# Patient Record
Sex: MALE | Race: WHITE | NOT HISPANIC OR LATINO | URBAN - METROPOLITAN AREA
[De-identification: names, ages, dates, MRNs, and addresses within clinical notes are randomized per-mention and may not be internally consistent; named-entity substitution may affect disease eponyms.]

---

## 2021-03-03 DIAGNOSIS — Z23 NEED FOR VACCINATION: ICD-10-CM

## 2022-10-24 ENCOUNTER — TELEMEDICINE (OUTPATIENT)
Dept: BEHAVIORAL HEALTH | Facility: CLINIC | Age: 67
End: 2022-10-24
Payer: COMMERCIAL

## 2022-10-24 DIAGNOSIS — F90.0 ATTENTION DEFICIT HYPERACTIVITY DISORDER (ADHD), PREDOMINANTLY INATTENTIVE TYPE: ICD-10-CM

## 2022-10-24 DIAGNOSIS — F90.0 ADHD (ATTENTION DEFICIT HYPERACTIVITY DISORDER), INATTENTIVE TYPE: ICD-10-CM

## 2022-10-24 DIAGNOSIS — F33.2 MDD (MAJOR DEPRESSIVE DISORDER), RECURRENT SEVERE, WITHOUT PSYCHOSIS (HCC): ICD-10-CM

## 2022-10-24 PROCEDURE — 99204 OFFICE O/P NEW MOD 45 MIN: CPT | Mod: 95 | Performed by: PSYCHIATRY & NEUROLOGY

## 2022-10-24 PROCEDURE — 90833 PSYTX W PT W E/M 30 MIN: CPT | Mod: 95 | Performed by: PSYCHIATRY & NEUROLOGY

## 2022-10-24 RX ORDER — VORTIOXETINE 10 MG/1
10 TABLET, FILM COATED ORAL
Qty: 90 TABLET | Refills: 0 | Status: SHIPPED | OUTPATIENT
Start: 2022-10-24 | End: 2023-03-06

## 2022-10-24 RX ORDER — DEXTROAMPHETAMINE SACCHARATE, AMPHETAMINE ASPARTATE, DEXTROAMPHETAMINE SULFATE AND AMPHETAMINE SULFATE 2.5; 2.5; 2.5; 2.5 MG/1; MG/1; MG/1; MG/1
10 TABLET ORAL 3 TIMES DAILY
Qty: 90 TABLET | Refills: 0 | Status: SHIPPED | OUTPATIENT
Start: 2022-11-03 | End: 2022-11-01 | Stop reason: SDUPTHER

## 2022-10-24 RX ORDER — DEXTROAMPHETAMINE SACCHARATE, AMPHETAMINE ASPARTATE, DEXTROAMPHETAMINE SULFATE AND AMPHETAMINE SULFATE 2.5; 2.5; 2.5; 2.5 MG/1; MG/1; MG/1; MG/1
10 TABLET ORAL EVERY MORNING
Qty: 90 TABLET | Refills: 0 | Status: SHIPPED | OUTPATIENT
Start: 2022-11-04 | End: 2022-10-24 | Stop reason: SDUPTHER

## 2022-10-24 ASSESSMENT — PATIENT HEALTH QUESTIONNAIRE - PHQ9
SUM OF ALL RESPONSES TO PHQ QUESTIONS 1-9: 20
CLINICAL INTERPRETATION OF PHQ2 SCORE: 6
5. POOR APPETITE OR OVEREATING: 3 - NEARLY EVERY DAY

## 2022-10-24 NOTE — PROGRESS NOTES
"[facility] AT [facility]  INITIAL PSYCHIATRY EVALUATION    This evaluation was conducted via Zoom, using secure and encrypted videoconferencing technology. The patient was physically located at their home address in [place], [place], and the physician was located at her home office in [place], [place]. The patient was presented by self. The patient’s identity was confirmed and verbal consent for the telemedicine encounter was obtained.      CC:  Initial Evaluation and Medication Management of Mental Health Symptoms      History Of Present Illness:  Hudson Fitch is a 67 y.o. old male with history of ADHD, Inattentive Type and MDD, [facility], presents today to establish care and for evaluation.     The patient reported the following:  He was diagnosed approx 2001 with ADHD after working as a  and evaluating children for ADHD.  He realized he struggled with the symptoms.  He endorses all the inattentive symptoms but denies hyperactive or impulsive symptoms.  He takes Adderall 10 mg TID.  [facility]    MDD:  He takes Wellbutrin 150 mg BID.  He endorses anhedonia, feeling depressed and down but denies feelings of hopelessness.  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  He watches a lot of TV, news, and doesn't have the motivation to do much more.  He does play pickle ball once a week.  He feels tired all the time and has low energy.  He sleeps pretty well, no problems falling asleep, despite the stimulant, wellbutrin and drinking 4 cups of coffee/day.  F/u on snoring next visit.  He has gained 20 lbs approx. over the last year.  He moves more slowly and talks less than he did in the past.    ROS: As noted above in HPI.        Past Psychiatric History:  Denies any " hospitalizations  Denies any history of SI, SA or self harm  Medication trials:  Lexapro, Zoloft, Prozac, Effexor - didn't think any of these worked.  Has been on Wellbutrin for approximately 3 years.      Family Psychiatric History:  Denies    Substance Use/Addiction History:  Alcohol:  Denies  Cannabis:  about twice a month smokes to fall asleep  Tobacco:  Denies  Caffeine:  4 cups of coffee/day  Other:  Denies any other substances    Social History:  He was born in [place], moved to [place] approx age 3, enjoyed school, denies any hx of abuse or trauma, masters degree in social work, worked for the [employer] [place] until approx. 2011 when there were budget cuts and he was moved to part-time, moved to [place] to be close to his brother who took a job in [place].  Used to enjoy skiing and hunting.  Never .  Served in the [employer] for 3 years and then the [employer].    Allergies:  Pcn [penicillins]      Physical Examination and Mental Status Exam:  Vital signs: There were no vitals taken for this visit.    CONSTITUTIONAL:  General Appearance:  Casual attire, good eye contact, engaged with provider    ORIENTATION:  Oriented to time, place and person  RECENT AND REMOTE MEMORY:  Grossly intact  ATTENTION SPAN AND CONCENTRATION:  within normal range  LANGUAGE:  no deficits appreciated  FUND OF KNOWLEDGE:  has awareness of current events, past history and normal vocabulary  SPEECH:  normal volume, amount, rate and articulation, no perseveration or paucity of language  MOOD:  Depressed   AFFECT:  Congruent with mood  THOUGHT PROCESS:  logical and goal directed  THOUGHT CONTENT:  Denies any SI/HI or AVH, no delusional thinking nor preoccupations appreciated  ASSOCIATIONS:  Intact, not loose, no tangentiality or circumstantiality  MEMORY:  No gross evidence of memory deficits  JUDGMENT:  adequate concerning everyday activities  INSIGHT:  adequate to psychiatric condition    DIAGNOSTIC IMPRESSION:  1. ADHD (attention deficit hyperactivity  disorder), inattentive type  - amphetamine-dextroamphetamine (ADDERALL, 10MG,) 10 MG Tab; Take 1 Tablet by mouth every morning for 10 days.  Dispense: 90 Tablet; Refill: 0    2. MDD (major depressive disorder), recurrent severe, without psychosis (HCC)  - Vortioxetine HBr (TRINTELLIX) 10 MG Tab; Take 10 mg by mouth every day.  Dispense: 90 Tablet; Refill: 0       Assessment and Plan:  The patient's risk of suicide is assessed as low.  1.  MDD, recurrent, severe, worsening  ADHD  R/o MCI  R/o SAMMY  Fatigue/low energy  F/u on snoring  Begin Trintellix 10 mg, MDD  Continue Adderall 10 mg TID, ADHD  Continue Wellbutrin 150 mg BID  Educated him about Auvelity, and he can add Dextromethorphan OTC 45 mg BID along with the Wellbutrtin, for MDD  He is not interested in TMS or Ketamine  Educated him about the IOP, he will think about it  Educated him about sleep music for falling asleep  Will be mindful he smokes MJ a couple of times a month to help him fall asleep  Encouraged him to make an appt with a [facility] for a baseline cognitive assessment  Encouraged him to make an appt with a therapist through the [facility]  Completed a PHQ9, score 20  Reviewed [place] PDMP  Reviewed his medical record    2.  Developed a safety plan with the patient which included the 1800 crisis line phone and text lines or going to the nearest ED if symptoms worsen    3.  Risks, benefits, alternatives and side effects were discussed for all medicines prescribed at this visit.  The patient voiced understanding providing informed consent.  The patient agrees to call the clinic with any questions or concerns, or seek emergent medical care if warranted.    4.  Follow up in 4 weeks or call sooner PRN    The proposed treatment plan was discussed with the patient who was provided the opportunity to ask questions and make suggestions regarding alternative treatment. Patient verbalized understanding and expressed agreement with the plan.     Greater than 16  minutes of the visit was spent in psychotherapy.  Psychotherapy include:  Provided the patient with supportive psychotherapy to build rapport and establish a therapeutic alliance with the patient, psychoeducation, topics: reviewed of symptoms of depression, history of ADHD, cannabis use, sleep music, socialization.     Holly Wylie M.D.      This note was created using voice recognition software (Dragon). The accuracy of the dictation is limited by the abilities of the software. I have reviewed the note prior to signing, however some errors in grammar and context are still possible. If you have any questions related to this note please do not hesitate to contact our office.

## 2022-11-01 DIAGNOSIS — F90.0 ADHD (ATTENTION DEFICIT HYPERACTIVITY DISORDER), INATTENTIVE TYPE: ICD-10-CM

## 2022-11-01 NOTE — TELEPHONE ENCOUNTER
Caller Name: Hudson Fitch  Call Back Number: [phone]    How would the patient prefer to be contacted with a response: Phone call do NOT leave a detailed message    Patient states he [facility], [facility] looks like it was sent to the [facility] pharmacy but his insurance will not pay for it unless it's at the [facility] pharmacy. Patient states he runs out on 11/4/22. Please advise.

## 2022-11-02 RX ORDER — DEXTROAMPHETAMINE SACCHARATE, AMPHETAMINE ASPARTATE, DEXTROAMPHETAMINE SULFATE AND AMPHETAMINE SULFATE 2.5; 2.5; 2.5; 2.5 MG/1; MG/1; MG/1; MG/1
10 TABLET ORAL 3 TIMES DAILY
Qty: 90 TABLET | Refills: 0 | Status: SHIPPED | OUTPATIENT
Start: 2022-11-03 | End: 2022-11-21

## 2022-11-21 ENCOUNTER — TELEMEDICINE (OUTPATIENT)
Dept: BEHAVIORAL HEALTH | Facility: CLINIC | Age: 67
End: 2022-11-21
Payer: COMMERCIAL

## 2022-11-21 DIAGNOSIS — F90.0 ATTENTION DEFICIT HYPERACTIVITY DISORDER (ADHD), PREDOMINANTLY INATTENTIVE TYPE: ICD-10-CM

## 2022-11-21 DIAGNOSIS — F33.2 MDD (MAJOR DEPRESSIVE DISORDER), RECURRENT SEVERE, WITHOUT PSYCHOSIS (HCC): ICD-10-CM

## 2022-11-21 DIAGNOSIS — G31.84 MCI (MILD COGNITIVE IMPAIRMENT): ICD-10-CM

## 2022-11-21 DIAGNOSIS — F90.0 ADHD (ATTENTION DEFICIT HYPERACTIVITY DISORDER), INATTENTIVE TYPE: ICD-10-CM

## 2022-11-21 PROCEDURE — 90833 PSYTX W PT W E/M 30 MIN: CPT | Mod: 95 | Performed by: PSYCHIATRY & NEUROLOGY

## 2022-11-21 PROCEDURE — 99214 OFFICE O/P EST MOD 30 MIN: CPT | Mod: 95 | Performed by: PSYCHIATRY & NEUROLOGY

## 2022-11-21 RX ORDER — DEXTROAMPHETAMINE SACCHARATE, AMPHETAMINE ASPARTATE, DEXTROAMPHETAMINE SULFATE AND AMPHETAMINE SULFATE 3.75; 3.75; 3.75; 3.75 MG/1; MG/1; MG/1; MG/1
15 TABLET ORAL 2 TIMES DAILY
Qty: 90 TABLET | Refills: 0 | Status: SHIPPED | OUTPATIENT
Start: 2022-11-21 | End: 2022-11-22

## 2022-11-21 NOTE — PROGRESS NOTES
"[facility] AT [facility]  PSYCHIATRIC FOLLOW-UP NOTE    This evaluation was conducted via Zoom, using secure and encrypted videoconferencing technology. The patient was physically located at their home address in [place], [place], and the physician was located at her home office in [place], [place]. The patient was presented by self. The patient’s identity was confirmed and verbal consent for the telemedicine encounter was obtained.    CC:  Presents for follow up visit for medication evaluation and management        History Of Present Illness:  Hudson Fitch is a 67 y.o. old male with history of ADHD, Inattentive Type and MDD, and SAMMY - not complaint with CPAP, [facility], presents today for a follow up visit.    The patient reported the following:  The [facility] would not approve the Trintellix.   He feels about the same as last visit, no motivation, no energy.  He does have a CPAP but says it doesn't help how he feels, educated him about benefits, says the readings show it is working correctly. 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  He took a higher dose of Adderall in the past and it did help.    History from 10/24/22 visit: \"He was diagnosed approx 2001 with ADHD after working as a  and evaluating children for ADHD.  He realized he struggled with the symptoms.  He endorses all the inattentive symptoms but denies hyperactive or impulsive symptoms.  He takes Adderall 10 mg TID.  [facility].    MDD:  He takes Wellbutrin 150 mg BID.  He endorses anhedonia, feeling depressed and down but denies feelings of hopelessness.  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  He watches a lot of TV, news, and doesn't have the motivation to do much more.  He does play pickle ball once a week.  He feels tired all the time and has low energy.  He sleeps pretty well, no problems falling asleep, despite the stimulant, wellbutrin and drinking 4 cups of coffee/day.  F/u on snoring next visit.  He has gained 20 lbs approx. over the last year.  He moves more slowly and talks less than he did in the past.\"    ROS: As noted above in HPI.        Past Psychiatric History:  Denies any hospitalizations  Denies any history of SI, SA or self harm  Medication trials:  Lexapro, Zoloft, Prozac, Effexor - didn't think any of these worked.  Has been on Wellbutrin for approximately 3 years.      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  Used to enjoy skiing and hunting.  Never .  Served in the [employer] for 3 years and then the [employer].    Allergies:  Pcn [penicillins]      Physical Examination and Mental Status Exam:  Vital signs: There were no vitals taken for this visit.    CONSTITUTIONAL:  General Appearance:  Casual attire, good eye contact, engaged with provider    ORIENTATION:  Oriented to time, place and person  RECENT AND REMOTE MEMORY:  Grossly intact  ATTENTION SPAN AND CONCENTRATION:  within normal range  LANGUAGE:  no deficits appreciated  FUND OF KNOWLEDGE:  has awareness of current events, " past history and normal vocabulary  SPEECH:  normal volume, amount, rate and articulation, no perseveration or paucity of language  MOOD:  Depressed   AFFECT:  Constricted  THOUGHT PROCESS:  logical and goal directed  THOUGHT CONTENT:  Denies any SI/HI or AVH, no delusional thinking nor preoccupations appreciated  ASSOCIATIONS:  Intact, not loose, no tangentiality or circumstantiality  MEMORY:  No gross evidence of memory deficits  JUDGMENT:  adequate concerning everyday activities  INSIGHT:  adequate to psychiatric condition    DIAGNOSTIC IMPRESSION:  1. ADHD (attention deficit hyperactivity disorder), inattentive type  - amphetamine-dextroamphetamine (ADDERALL, 15MG,) 15 MG tablet; Take 1 Tablet by mouth 2 times a day for 30 days.  Dispense: 90 Tablet; Refill: 0       Assessment and Plan:  The patient's risk of suicide is assessed as low.  Will be mindful he has had 2 ischemic strokes that affected him cognitively, reduced IQ by 30 points first stroke and he is on Coumadin.  1.  MDD, recurrent, severe, no change  ADHD  R/o MCI  SAMMY, uncontrolled  MCI, no change  Fatigue/low energy, no change  Educated him about MIND diet for protective properties against Dementia  Hold, [facility] wouldn't approve: Begin Trintellix 10 mg, MDD  Increase Adderall to 15 mg TID, from 10 mg TID, ADHD and off lable for MDD  Continue Wellbutrin 150 mg BID  Previously educated him about Auvelity, and he can add Dextromethorphan OTC 45 mg BID along with the Wellbutrtin, for MDD  He is not interested in TMS or Ketamine  Previously educated him about the IOP, he will think about it  Previously educated him about sleep music for falling asleep  Will be mindful he smokes MJ a couple of times a month to help him fall asleep  Encouraged him to make an appt with a [facility] Neurologist for a baseline cognitive assessment  Encouraged him to make an appt with a therapist through the [facility]  Initial visit  PHQ9, score 20  Reviewed [place] PDMP  Reviewed prior visit HPI,  "histories and treatment plan in preparation for today's visit      2.  The patient has a safety plan that includes calling the 1-800 crisis line number, calling 911 and/or going to the nearest Emergency Department if symptoms worsen.    3.  Risks, benefits, alternatives and side effects were discussed for all medicines prescribed at this visit.  The patient voiced understanding providing informed consent.  The patient agrees to call the clinic with any questions or concerns, or seek emergent medical care if warranted.    4.  Follow up in 4 weeks or call sooner PRN    The proposed treatment plan was discussed with the patient who was provided the opportunity to ask questions and make suggestions regarding alternative treatment. Patient verbalized understanding and expressed agreement with the plan.     Greater than 16 minutes of the visit was spent in psychotherapy.     Psychotherapy include:  Supportive psychotherapy and psychoeducation, topics: MIND diet, diets he has tried in the past, history of cognitive decline, college at [employer] and went from As to Fs. Importance of using CPAP - cognitive center of brain robbed of oxygen with uncontrolled SAMMY.  Cognitive struggles - processing and word finding. \"I'm a fraction of who I used to be.\"        Holly Wylie M.D.      This note was created using voice recognition software (Dragon). The accuracy of the dictation is limited by the abilities of the software. I have reviewed the note prior to signing, however some errors in grammar and context are still possible. If you have any questions related to this note please do not hesitate to contact our office.   "

## 2022-11-22 DIAGNOSIS — F90.0 ADHD (ATTENTION DEFICIT HYPERACTIVITY DISORDER), INATTENTIVE TYPE: ICD-10-CM

## 2022-11-22 RX ORDER — DEXTROAMPHETAMINE SACCHARATE, AMPHETAMINE ASPARTATE, DEXTROAMPHETAMINE SULFATE AND AMPHETAMINE SULFATE 2.5; 2.5; 2.5; 2.5 MG/1; MG/1; MG/1; MG/1
15 TABLET ORAL 3 TIMES DAILY PRN
Qty: 135 TABLET | Refills: 0 | Status: SHIPPED | OUTPATIENT
Start: 2022-11-22 | End: 2022-12-09 | Stop reason: SDUPTHER

## 2022-12-09 ENCOUNTER — TELEMEDICINE (OUTPATIENT)
Dept: BEHAVIORAL HEALTH | Facility: CLINIC | Age: 67
End: 2022-12-09
Payer: COMMERCIAL

## 2022-12-09 DIAGNOSIS — F90.0 ADHD (ATTENTION DEFICIT HYPERACTIVITY DISORDER), INATTENTIVE TYPE: ICD-10-CM

## 2022-12-09 DIAGNOSIS — F33.2 MDD (MAJOR DEPRESSIVE DISORDER), RECURRENT SEVERE, WITHOUT PSYCHOSIS (HCC): ICD-10-CM

## 2022-12-09 DIAGNOSIS — F90.0 ATTENTION DEFICIT HYPERACTIVITY DISORDER (ADHD), PREDOMINANTLY INATTENTIVE TYPE: ICD-10-CM

## 2022-12-09 PROCEDURE — 99214 OFFICE O/P EST MOD 30 MIN: CPT | Mod: 95 | Performed by: PSYCHIATRY & NEUROLOGY

## 2022-12-09 RX ORDER — DEXTROAMPHETAMINE SACCHARATE, AMPHETAMINE ASPARTATE, DEXTROAMPHETAMINE SULFATE AND AMPHETAMINE SULFATE 2.5; 2.5; 2.5; 2.5 MG/1; MG/1; MG/1; MG/1
15 TABLET ORAL 3 TIMES DAILY PRN
Qty: 135 TABLET | Refills: 0 | Status: SHIPPED | OUTPATIENT
Start: 2023-01-21 | End: 2023-02-03 | Stop reason: SDUPTHER

## 2022-12-09 RX ORDER — BUPROPION HYDROCHLORIDE 150 MG/1
150 TABLET, EXTENDED RELEASE ORAL 2 TIMES DAILY
Qty: 180 TABLET | Refills: 0 | Status: SHIPPED | OUTPATIENT
Start: 2022-12-09 | End: 2023-03-06 | Stop reason: SDUPTHER

## 2022-12-09 RX ORDER — DEXTROAMPHETAMINE SACCHARATE, AMPHETAMINE ASPARTATE, DEXTROAMPHETAMINE SULFATE AND AMPHETAMINE SULFATE 2.5; 2.5; 2.5; 2.5 MG/1; MG/1; MG/1; MG/1
10 TABLET ORAL 3 TIMES DAILY PRN
Qty: 135 TABLET | Refills: 0 | Status: SHIPPED | OUTPATIENT
Start: 2023-02-20 | End: 2023-03-06 | Stop reason: SDUPTHER

## 2022-12-09 RX ORDER — DEXTROAMPHETAMINE SACCHARATE, AMPHETAMINE ASPARTATE, DEXTROAMPHETAMINE SULFATE AND AMPHETAMINE SULFATE 2.5; 2.5; 2.5; 2.5 MG/1; MG/1; MG/1; MG/1
15 TABLET ORAL 3 TIMES DAILY PRN
Qty: 135 TABLET | Refills: 0 | Status: SHIPPED | OUTPATIENT
Start: 2022-12-22 | End: 2023-01-21

## 2022-12-09 NOTE — PROGRESS NOTES
"[facility]  PSYCHIATRIC FOLLOW-UP NOTE    This evaluation was conducted via Zoom, using secure and encrypted videoconferencing technology. The patient was physically located at their home address in [place], [place], and the physician was located at her home office in [place], [place]. The patient was presented by self. The patient’s identity was confirmed and verbal consent for the telemedicine encounter was obtained.    CC:  Presents for follow up visit for medication evaluation and management        History Of Present Illness:  Hudson Fitch is a 67 y.o. old male with history of ADHD, Inattentive Type and MDD, and SAMMY - not complaint with CPAP, hx of ischemic stroke in 2010 and says neuropsych testing showed his IQ dropped 30 pts and then he had a second stroke in 2012, [facility], presents today for a follow up visit.    The patient reported the following:  He is very pleased with how much better he feels and is doing with the increase of Adderall from 10 mg TID to 15 mg TID.  Has more energy, feels better able to tackle things he needs to get done, says he hasn't felt this good in many years, and he doesn't want to start an antidepressant at this time, we were not able to get the  [facility] to approve the Trintellix at his visit before last.  He is sleeping fine, denies any SE.  Has a BP cuff but it is old, educated him about importance to check it given Adderall can increase BP and Pulse and given his hx of stroke.      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  He took a higher dose of Adderall in the past and it did help.    History from 10/24/22 visit: \"He was diagnosed approx 2001 with ADHD after working as a  and evaluating children for ADHD.  He " "realized he struggled with the symptoms.  He endorses all the inattentive symptoms but denies hyperactive or impulsive symptoms.  He takes Adderall 10 mg TID.  [facility].    MDD:  He takes Wellbutrin 150 mg BID.  He endorses anhedonia, feeling depressed and down but denies feelings of hopelessness.  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  He watches a lot of TV, news, and doesn't have the motivation to do much more.  He does play pickle ball once a week.  He feels tired all the time and has low energy.  He sleeps pretty well, no problems falling asleep, despite the stimulant, wellbutrin and drinking 4 cups of coffee/day.  F/u on snoring next visit.  He has gained 20 lbs approx. over the last year.  He moves more slowly and talks less than he did in the past.\"    ROS: As noted above in HPI.        Past Psychiatric History:  Denies any hospitalizations  Denies any history of SI, SA or self harm  Medication trials:  Lexapro, Zoloft, Prozac, Effexor - didn't think any of these worked.  Has been on Wellbutrin for approximately 3 years.      Family Psychiatric History:  Denies    Substance Use/Addiction History:  Alcohol:  Denies  Cannabis:  about twice a month smokes to fall asleep  Tobacco:  Denies  Caffeine:  4 cups of coffee/day  Other:  Denies any other substances    Social History:  He was born in [place], moved to [place] appro[place] age 3, enjoyed school, denies any hx of abuse or trauma, masters degree in social work, worked for the [employer] until approx. 2011 when there were budget cuts and he was moved to part-time, moved to [place] to be close to his brother who took a job in [place].  Used to enjoy skiing and hunting.  Never .  Served in the [employer] for 3 years and then the [employer].    Allergies:  Pcn " [penicillins]      Physical Examination and Mental Status Exam:  Vital signs: There were no vitals taken for this visit.    CONSTITUTIONAL:  General Appearance:  Casual attire, good eye contact, engaged with provider    ORIENTATION:  Oriented to time, place and person  RECENT AND REMOTE MEMORY:  Grossly intact  ATTENTION SPAN AND CONCENTRATION:  within normal range  LANGUAGE:  no deficits appreciated  FUND OF KNOWLEDGE:  has awareness of current events, past history and normal vocabulary  SPEECH:  normal volume, amount, rate and articulation, no perseveration or paucity of language  MOOD:  Neutral  AFFECT:  Mood congreunt  THOUGHT PROCESS:  logical and goal directed  THOUGHT CONTENT:  Denies any SI/HI or AVH, no delusional thinking nor preoccupations appreciated  ASSOCIATIONS:  Intact, not loose, no tangentiality or circumstantiality  MEMORY:  No gross evidence of memory deficits  JUDGMENT:  adequate concerning everyday activities  INSIGHT:  adequate to psychiatric condition    DIAGNOSTIC IMPRESSION:  1. ADHD (attention deficit hyperactivity disorder), inattentive type  - amphetamine-dextroamphetamine (ADDERALL, 10MG,) 10 MG Tab; Take 1.5 Tablets by mouth 3 times a day as needed (ADHD) for up to 30 days.  Dispense: 135 Tablet; Refill: 0  - amphetamine-dextroamphetamine (ADDERALL, 10MG,) 10 MG Tab; Take 1.5 Tablets by mouth 3 times a day as needed (ADHD) for up to 30 days.  Dispense: 135 Tablet; Refill: 0  - amphetamine-dextroamphetamine (ADDERALL, 10MG,) 10 MG Tab; Take 1 Tablet by mouth 3 times a day as needed (ADHD) for up to 30 days.  Dispense: 135 Tablet; Refill: 0    2. MDD (major depressive disorder), recurrent severe, without psychosis ([facility])  - buPROPion SR (WELLBUTRIN-SR) 150 MG TABLET SR 12 HR sustained-release tablet; Take 1 Tablet by mouth 2 times a day.  Dispense: 180 Tablet; Refill: 0    3. Attention deficit hyperactivity disorder (ADHD), predominantly inattentive type  - buPROPion SR (WELLBUTRIN-SR)  150 MG TABLET SR 12 HR sustained-release tablet; Take 1 Tablet by mouth 2 times a day.  Dispense: 180 Tablet; Refill: 0       Assessment and Plan:  The patient's risk of suicide is assessed as low.  Will be mindful he has had 2 ischemic strokes that affected him cognitively, reduced IQ by 30 points first stroke and he is on Coumadin.  1.  MDD, recurrent, severe, improving  ADHD, improving  R/o MCI  SAMMY, uncontrolled  MCI, no change  Fatigue/low energy, improving  Obtain BP cuff to monitor BP while on Adderall  Previously educated him about MIND diet for protective properties against Dementia  Hold, VA wouldn't approve: Begin Trintellix 10 mg, MDD  Continue Adderall 15 mg TID, from 10 mg TID, ADHD and off lable for MDD, increased 11/21/22  Continue Wellbutrin 150 mg BID  Previously educated him about Auvelity, and he can add Dextromethorphan OTC 45 mg BID along with the Wellbutrtin, for MDD  He is not interested in TMS or Ketamine  Previously educated him about the IOP, he will think about it  Previously educated him about sleep music for falling asleep  Will be mindful he smokes MJ a couple of times a month to help him fall asleep  Encouraged him to make an appt with a [facility] Neurologist for a baseline cognitive assessment  Encouraged him to make an appt with a therapist through the [facility]  Initial visit  PHQ9, score 20  Reviewed [place] PDMP  Reviewed prior visit HPI, histories and treatment plan in preparation for today's visit      2.  The patient has a safety plan that includes calling the 1-800 crisis line number, calling 911 and/or going to the nearest Emergency Department if symptoms worsen.    3.  Risks, benefits, alternatives and side effects were discussed for all medicines prescribed at this visit.  The patient voiced understanding providing informed consent.  The patient agrees to call the clinic with any questions or concerns, or seek emergent medical care if warranted.    4.  Follow up in 12 weeks or call sooner  PRN    The proposed treatment plan was discussed with the patient who was provided the opportunity to ask questions and make suggestions regarding alternative treatment. Patient verbalized understanding and expressed agreement with the plan.       Holly Wylie M.D.      This note was created using voice recognition software (Dragon). The accuracy of the dictation is limited by the abilities of the software. I have reviewed the note prior to signing, however some errors in grammar and context are still possible. If you have any questions related to this note please do not hesitate to contact our office.

## 2023-02-03 DIAGNOSIS — F90.0 ADHD (ATTENTION DEFICIT HYPERACTIVITY DISORDER), INATTENTIVE TYPE: ICD-10-CM

## 2023-02-03 RX ORDER — DEXTROAMPHETAMINE SACCHARATE, AMPHETAMINE ASPARTATE, DEXTROAMPHETAMINE SULFATE AND AMPHETAMINE SULFATE 2.5; 2.5; 2.5; 2.5 MG/1; MG/1; MG/1; MG/1
15 TABLET ORAL 3 TIMES DAILY
Qty: 135 TABLET | Refills: 0 | Status: SHIPPED | OUTPATIENT
Start: 2023-02-03 | End: 2023-03-05

## 2023-02-03 NOTE — TELEPHONE ENCOUNTER
's patient.   Can you please re-send the Adderall prescription, the pharmacy does not keep prescriptions on hold and patient has been out since 1/29. Thank you!        Received request via: Pharmacy    Was the patient seen in the last year in this department? Yes    Does the patient have an active prescription (recently filled or refills available) for medication(s) requested? No    Does the patient have intermediate Plus and need 100 day supply (blood pressure, diabetes and cholesterol meds only)? Medication is not for cholesterol, blood pressure or diabetes and Patient does not have SCP

## 2023-03-06 ENCOUNTER — TELEMEDICINE (OUTPATIENT)
Dept: BEHAVIORAL HEALTH | Facility: CLINIC | Age: 68
End: 2023-03-06
Payer: COMMERCIAL

## 2023-03-06 DIAGNOSIS — F33.2 MDD (MAJOR DEPRESSIVE DISORDER), RECURRENT SEVERE, WITHOUT PSYCHOSIS (HCC): ICD-10-CM

## 2023-03-06 DIAGNOSIS — F90.0 ATTENTION DEFICIT HYPERACTIVITY DISORDER (ADHD), PREDOMINANTLY INATTENTIVE TYPE: ICD-10-CM

## 2023-03-06 DIAGNOSIS — F90.0 ADHD (ATTENTION DEFICIT HYPERACTIVITY DISORDER), INATTENTIVE TYPE: ICD-10-CM

## 2023-03-06 PROCEDURE — 90833 PSYTX W PT W E/M 30 MIN: CPT | Mod: 95 | Performed by: PSYCHIATRY & NEUROLOGY

## 2023-03-06 PROCEDURE — 99214 OFFICE O/P EST MOD 30 MIN: CPT | Mod: 95 | Performed by: PSYCHIATRY & NEUROLOGY

## 2023-03-06 RX ORDER — DEXTROAMPHETAMINE SACCHARATE, AMPHETAMINE ASPARTATE, DEXTROAMPHETAMINE SULFATE AND AMPHETAMINE SULFATE 2.5; 2.5; 2.5; 2.5 MG/1; MG/1; MG/1; MG/1
15 TABLET ORAL 3 TIMES DAILY PRN
Qty: 135 TABLET | Refills: 0 | Status: SHIPPED | OUTPATIENT
Start: 2023-05-07 | End: 2023-05-08 | Stop reason: SDUPTHER

## 2023-03-06 RX ORDER — DEXTROAMPHETAMINE SACCHARATE, AMPHETAMINE ASPARTATE, DEXTROAMPHETAMINE SULFATE AND AMPHETAMINE SULFATE 2.5; 2.5; 2.5; 2.5 MG/1; MG/1; MG/1; MG/1
15 TABLET ORAL 3 TIMES DAILY PRN
Qty: 135 TABLET | Refills: 0 | Status: SHIPPED | OUTPATIENT
Start: 2023-04-06 | End: 2023-04-07 | Stop reason: SDUPTHER

## 2023-03-06 RX ORDER — BUPROPION HYDROCHLORIDE 150 MG/1
150 TABLET, EXTENDED RELEASE ORAL 2 TIMES DAILY
Qty: 180 TABLET | Refills: 1 | Status: SHIPPED | OUTPATIENT
Start: 2023-03-06 | End: 2023-09-11 | Stop reason: SDUPTHER

## 2023-03-06 RX ORDER — DEXTROAMPHETAMINE SACCHARATE, AMPHETAMINE ASPARTATE, DEXTROAMPHETAMINE SULFATE AND AMPHETAMINE SULFATE 2.5; 2.5; 2.5; 2.5 MG/1; MG/1; MG/1; MG/1
15 TABLET ORAL 3 TIMES DAILY PRN
Qty: 135 TABLET | Refills: 0 | Status: SHIPPED | OUTPATIENT
Start: 2023-03-06 | End: 2023-04-05

## 2023-03-06 ASSESSMENT — PATIENT HEALTH QUESTIONNAIRE - PHQ9
CLINICAL INTERPRETATION OF PHQ2 SCORE: 0
5. POOR APPETITE OR OVEREATING: 0 - NOT AT ALL

## 2023-03-06 NOTE — PROGRESS NOTES
[facility] AT [facility]  PSYCHIATRIC FOLLOW-UP NOTE    This evaluation was conducted via Zoom, using secure and encrypted videoconferencing technology. The patient was physically located at their home address in [place], [place], and the physician was located at her home office in [place], [place]. The patient was presented by self. The patient’s identity was confirmed and verbal consent for the telemedicine encounter was obtained.    CC:  Presents for follow up visit for medication evaluation and management        History Of Present Illness:  Hudson Fitch is a 67 y.o. old male with history of ADHD, Inattentive Type and MDD, and SAMMY - not complaint with CPAP, hx of ischemic stroke in 2010 and says neuropsych testing showed his IQ dropped 30 pts and then he had a second [facility] in 2012[facility], [facility] today for a follow up visit.    The patient reported the following:  He continues to be doing really well, having more energy, feeling more positive, enjoying life more, being able to focus and concentrate to get things done.  He is working 4 days per week 4 hours each of those days, on a psych montoya, I believe, in meal prep, has friends/relationships at work and it's important to him, had neuropsych testing in 2008/09 following his strokes and that's where he learned his IQ went from 131 to 100.  No medication SE, no chest pain or palpitations.  He forgot to check his BP but agreed to do so.  Has hx of a cardiomyopathy but started on Coreg and states that it resolved, has continued on Coreg. Has been on Adderall for many years.  Feeling so good he has started lifting weights at home.      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  He took a higher dose of Adderall in the past and it did  "help.    History from 10/24/22 visit: \"He was diagnosed approx 2001 with ADHD after working as a  and evaluating children for ADHD.  He realized he struggled with the symptoms.  He endorses all the inattentive symptoms but denies hyperactive or impulsive symptoms.  He takes Adderall 10 mg TID.  [facility]    MDD:  He takes Wellbutrin 150 mg BID.  He endorses anhedonia, feeling depressed and down but denies feelings of hopelessness.  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  He watches a lot of TV, news, and doesn't have the motivation to do much more.  He does play pickle ball once a week.  He feels tired all the time and has low energy.  He sleeps pretty well, no problems falling asleep, despite the stimulant, wellbutrin and drinking 4 cups of coffee/day.  F/u on snoring next visit.  He has gained 20 lbs approx. over the last year.  He moves more slowly and talks less than he did in the past.\"    ROS: As noted above in HPI.        Past Psychiatric History:  Denies any hospitalizations  Denies any history of SI, SA or self harm  Medication trials:  Lexapro, Zoloft, Prozac, Effexor - didn't think any of these worked.  Has been on Wellbutrin for approximately 3 years.      Family Psychiatric History:  Denies    Substance Use/Addiction History:  Alcohol:  Denies  Cannabis:  about twice a month smokes to fall asleep  Tobacco:  Denies  Caffeine:  4 cups of coffee/day, update 3/6/23 - 3 cups/day 1 AM 2 afternoon  Other:  Denies any other substances    Social History:  He was born in [place], moved to [place] approx age 3, enjoyed school, denies any hx of abuse or trauma, masters degree in social work, worked for the [employer] [place] until approx. 2011 when there were budget cuts and he was moved to part-time, moved to [place] " to be close to his brother who took a job in [place].  Used to enjoy skiing and hunting.  Never .  Served in the [employer] for 3 years and then the [employer].    Allergies:  Pcn [penicillins]      Physical Examination and Mental Status Exam:  Vital signs: There were no vitals taken for this visit.    CONSTITUTIONAL:  General Appearance:  Casual attire, good eye contact, engaged with provider    ORIENTATION:  Oriented to time, place and person  RECENT AND REMOTE MEMORY:  Grossly intact  ATTENTION SPAN AND CONCENTRATION:  within normal range  LANGUAGE:  no deficits appreciated  FUND OF KNOWLEDGE:  has awareness of current events, past history and normal vocabulary  SPEECH:  normal volume, amount, rate and articulation, no perseveration or paucity of language  MOOD:  Euthymic  AFFECT:  Full range  THOUGHT PROCESS:  logical and goal directed  THOUGHT CONTENT:  Denies any SI/HI or AVH, no delusional thinking nor preoccupations appreciated  ASSOCIATIONS:  Intact, not loose, no tangentiality or circumstantiality  MEMORY:  No gross evidence of memory deficits  JUDGMENT:  adequate concerning everyday activities  INSIGHT:  adequate to psychiatric condition    DIAGNOSTIC IMPRESSION:  1. ADHD (attention deficit hyperactivity disorder), inattentive type  - amphetamine-dextroamphetamine (ADDERALL, 10MG,) 10 MG Tab; Take 1.5 Tablets by mouth 3 times a day as needed (ADHD) for up to 30 days.  Dispense: 135 Tablet; Refill: 0  - amphetamine-dextroamphetamine (ADDERALL, 10MG,) 10 MG Tab; Take 1.5 Tablets by mouth 3 times a day as needed (Attention and Focus) for up to 30 days.  Dispense: 135 Tablet; Refill: 0  - amphetamine-dextroamphetamine (ADDERALL, 10MG,) 10 MG Tab; Take 1.5 Tablets by mouth 3 times a day as needed (Attention and Focus) for up to 30 days.  Dispense: 135 Tablet; Refill: 0    2. MDD (major depressive disorder), recurrent severe, without psychosis (HCC)  - amphetamine-dextroamphetamine (ADDERALL, 10MG,) 10 MG Tab;  Take 1.5 Tablets by mouth 3 times a day as needed (ADHD) for up to 30 days.  Dispense: 135 Tablet; Refill: 0  - amphetamine-dextroamphetamine (ADDERALL, 10MG,) 10 MG Tab; Take 1.5 Tablets by mouth 3 times a day as needed (Attention and Focus) for up to 30 days.  Dispense: 135 Tablet; Refill: 0  - amphetamine-dextroamphetamine (ADDERALL, 10MG,) 10 MG Tab; Take 1.5 Tablets by mouth 3 times a day as needed (Attention and Focus) for up to 30 days.  Dispense: 135 Tablet; Refill: 0  - buPROPion SR (WELLBUTRIN-SR) 150 MG TABLET SR 12 HR sustained-release tablet; Take 1 Tablet by mouth 2 times a day.  Dispense: 180 Tablet; Refill: 1    3. Attention deficit hyperactivity disorder (ADHD), predominantly inattentive type  - buPROPion SR (WELLBUTRIN-SR) 150 MG TABLET SR 12 HR sustained-release tablet; Take 1 Tablet by mouth 2 times a day.  Dispense: 180 Tablet; Refill: 1         Assessment and Plan:  The patient's risk of suicide is assessed as low.  Will be mindful he has had 2 ischemic strokes that affected him cognitively, reduced IQ by 30 points first stroke and he is on Coumadin.  1.  MDD, recurrent, severe, well controlled  ADHD, well controlled  R/o MCI  SAMMY, uncontrolled  MCI, no change  Got neuropsych testing following stroke in 2008/09, down from 131 to 100  Fatigue/low energy, resolved  Obtain BP cuff to monitor BP while on Adderall.  EDUCATED HIM ABOUT THE IMPORTANCE SO THAT THIS MD CAN CONTINUE HIS ADDERALL, HE AGREED TO DO SO  Previously educated him about MIND diet for protective properties against Dementia  Hold, [facility] wouldn't approve and now not needed: Begin Trintellix 10 mg, MDD  Continue Adderall 15 mg TID, from 10 mg TID, ADHD and off lable for MDD, increased 11/21/22  Continue Wellbutrin  mg BID  Previously educated him about Auvelity, and he can add Dextromethorphan OTC 45 mg BID along with the Wellbutrtin, for MDD  Previously educated him about sleep music for falling asleep  Will be mindful he smokes  MJ a couple of times a month to help him fall asleep  Encouraged him to make an appt with a therapist through the [facility]  Initial visit  PHQ9, score 20, score 3/6/23 0  Reviewed [place] PDMP  Reviewed prior visit HPI, histories and treatment plan in preparation for today's visit      2.  The patient has a safety plan that includes calling the 1-800 crisis line number, calling 911 and/or going to the nearest Emergency Department if symptoms worsen.    3.  Risks, benefits, alternatives and side effects were discussed for all medicines prescribed at this visit.  The patient voiced understanding providing informed consent.  The patient agrees to call the clinic with any questions or concerns, or seek emergent medical care if warranted.    4.  Follow up in 12 weeks or call sooner PRN    The proposed treatment plan was discussed with the patient who was provided the opportunity to ask questions and make suggestions regarding alternative treatment. Patient verbalized understanding and expressed agreement with the plan.     Greater than 16 minutes of the visit was spent in psychotherapy.     Psychotherapy include:  Supportive psychotherapy and psychoeducation, topics: socialization, benefits of working and interacting with others, self-care, working out, winter weather in [place], his love of living in [place].  Completed PHQ9.          Holly Wylie M.D.      This note was created using voice recognition software (Dragon). The accuracy of the dictation is limited by the abilities of the software. I have reviewed the note prior to signing, however some errors in grammar and context are still possible. If you have any questions related to this note please do not hesitate to contact our office.

## 2023-04-07 DIAGNOSIS — F90.0 ADHD (ATTENTION DEFICIT HYPERACTIVITY DISORDER), INATTENTIVE TYPE: ICD-10-CM

## 2023-04-07 DIAGNOSIS — F33.2 MDD (MAJOR DEPRESSIVE DISORDER), RECURRENT SEVERE, WITHOUT PSYCHOSIS (HCC): ICD-10-CM

## 2023-04-07 RX ORDER — DEXTROAMPHETAMINE SACCHARATE, AMPHETAMINE ASPARTATE, DEXTROAMPHETAMINE SULFATE AND AMPHETAMINE SULFATE 2.5; 2.5; 2.5; 2.5 MG/1; MG/1; MG/1; MG/1
15 TABLET ORAL 3 TIMES DAILY PRN
Qty: 135 TABLET | Refills: 0 | Status: SHIPPED | OUTPATIENT
Start: 2023-04-07 | End: 2023-04-30 | Stop reason: SDUPTHER

## 2023-04-07 NOTE — TELEPHONE ENCOUNTER
's patient.   Pharmacy needs a new script.       Received request via: Pharmacy    Was the patient seen in the last year in this department? Yes    Does the patient have an active prescription (recently filled or refills available) for medication(s) requested? No    Does the patient have nursing home Plus and need 100 day supply (blood pressure, diabetes and cholesterol meds only)? Medication is not for cholesterol, blood pressure or diabetes and Patient does not have SCP

## 2023-04-30 RX ORDER — DEXTROAMPHETAMINE SACCHARATE, AMPHETAMINE ASPARTATE, DEXTROAMPHETAMINE SULFATE AND AMPHETAMINE SULFATE 2.5; 2.5; 2.5; 2.5 MG/1; MG/1; MG/1; MG/1
15 TABLET ORAL 3 TIMES DAILY PRN
Qty: 135 TABLET | Refills: 0 | Status: SHIPPED | OUTPATIENT
Start: 2023-06-07 | End: 2023-07-07

## 2023-04-30 RX ORDER — DEXTROAMPHETAMINE SACCHARATE, AMPHETAMINE ASPARTATE, DEXTROAMPHETAMINE SULFATE AND AMPHETAMINE SULFATE 2.5; 2.5; 2.5; 2.5 MG/1; MG/1; MG/1; MG/1
15 TABLET ORAL 3 TIMES DAILY PRN
Qty: 135 TABLET | Refills: 0 | Status: SHIPPED | OUTPATIENT
Start: 2023-07-07 | End: 2023-07-11 | Stop reason: SDUPTHER

## 2023-05-08 DIAGNOSIS — F33.2 MDD (MAJOR DEPRESSIVE DISORDER), RECURRENT SEVERE, WITHOUT PSYCHOSIS (HCC): ICD-10-CM

## 2023-05-08 DIAGNOSIS — F90.0 ADHD (ATTENTION DEFICIT HYPERACTIVITY DISORDER), INATTENTIVE TYPE: ICD-10-CM

## 2023-05-08 NOTE — TELEPHONE ENCOUNTER
Pharmacy does not keep prescriptions on hold, needs a new script sent to them.       Received request via: Pharmacy    Was the patient seen in the last year in this department? Yes    Does the patient have an active prescription (recently filled or refills available) for medication(s) requested? No    Does the patient have prison Plus and need 100 day supply (blood pressure, diabetes and cholesterol meds only)? Medication is not for cholesterol, blood pressure or diabetes and Patient does not have SCP

## 2023-05-09 RX ORDER — DEXTROAMPHETAMINE SACCHARATE, AMPHETAMINE ASPARTATE, DEXTROAMPHETAMINE SULFATE AND AMPHETAMINE SULFATE 2.5; 2.5; 2.5; 2.5 MG/1; MG/1; MG/1; MG/1
15 TABLET ORAL 3 TIMES DAILY PRN
Qty: 135 TABLET | Refills: 0 | Status: SHIPPED | OUTPATIENT
Start: 2023-05-09 | End: 2023-06-05 | Stop reason: SDUPTHER

## 2023-06-05 ENCOUNTER — TELEMEDICINE (OUTPATIENT)
Dept: BEHAVIORAL HEALTH | Facility: CLINIC | Age: 68
End: 2023-06-05
Payer: COMMERCIAL

## 2023-06-05 DIAGNOSIS — F33.2 MDD (MAJOR DEPRESSIVE DISORDER), RECURRENT SEVERE, WITHOUT PSYCHOSIS (HCC): ICD-10-CM

## 2023-06-05 DIAGNOSIS — F90.0 ATTENTION DEFICIT HYPERACTIVITY DISORDER (ADHD), PREDOMINANTLY INATTENTIVE TYPE: ICD-10-CM

## 2023-06-05 DIAGNOSIS — F90.0 ADHD (ATTENTION DEFICIT HYPERACTIVITY DISORDER), INATTENTIVE TYPE: ICD-10-CM

## 2023-06-05 PROCEDURE — 90833 PSYTX W PT W E/M 30 MIN: CPT | Mod: 95 | Performed by: PSYCHIATRY & NEUROLOGY

## 2023-06-05 PROCEDURE — 99214 OFFICE O/P EST MOD 30 MIN: CPT | Mod: 95 | Performed by: PSYCHIATRY & NEUROLOGY

## 2023-06-05 RX ORDER — DEXTROAMPHETAMINE SACCHARATE, AMPHETAMINE ASPARTATE, DEXTROAMPHETAMINE SULFATE AND AMPHETAMINE SULFATE 2.5; 2.5; 2.5; 2.5 MG/1; MG/1; MG/1; MG/1
15 TABLET ORAL 3 TIMES DAILY PRN
Qty: 135 TABLET | Refills: 0 | Status: SHIPPED | OUTPATIENT
Start: 2023-06-09 | End: 2023-07-09

## 2023-06-05 NOTE — PROGRESS NOTES
"[facility] AT [facility]  PSYCHIATRIC FOLLOW-UP NOTE    This evaluation was conducted via Zoom, using secure and encrypted videoconferencing technology. The patient was physically located at their home address in [place], [place], and the physician was located at her home office in [place], [place]. The patient was presented by self. The patient’s identity was confirmed and verbal consent for the telemedicine encounter was obtained.    CC:  Presents for follow up visit for medication evaluation and management        History Of Present Illness:  Hudson Fitch is a 68 y.o. old male with history of ADHD, Inattentive Type and MDD, and SAMMY - not complaint with CPAP, hx of ischemic stroke in 2010 and says neuropsych testing showed his IQ dropped 30 pts and then he had a second stroke in 2012, [facility], presents today for a follow up visit.    The patient reported the following:  He continues to be doing well.  He fell on the snow and hit the back of his head a couple of months ago, knocked unconscious and no memory of it, wasn't able to work out for a couple of months, was working out at home, wants to lose 30 to 40 lbs.  Is now working 4 days per week, works at a [employer] [place] [employer] [address] [place] [address].  He is sleeping well.  He measured his BP and it was WNL, does have BP cuff at home, no SE to medications, does take the Adderall 15 mg TID.    had neuropsych testing in 2008/09 following his strokes and that's where he learned his IQ went from 131 to 100.      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  He took a higher dose of Adderall in the past and it did help.    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  He realized he struggled with the symptoms.  He endorses all the inattentive symptoms but denies hyperactive or impulsive symptoms.  He takes Adderall 10 mg TID.  [facility]    MDD:  He takes Wellbutrin 150 mg BID.  He endorses anhedonia, feeling depressed and down but denies feelings of hopelessness.  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  He watches a lot of TV, news, and doesn't have the motivation to do much more.  He does play pickle ball once a week.  He feels tired all the time and has low energy.  He sleeps pretty well, no problems falling asleep, despite the stimulant, wellbutrin and drinking 4 cups of coffee/day.  F/u on snoring next visit.  He has gained 20 lbs approx. over the last year.  He moves more slowly and talks less than he did in the past.\"    ROS: As noted above in HPI.        Past Psychiatric History:  Denies any hospitalizations  Denies any history of SI, SA or self harm  Medication trials:  Lexapro, Zoloft, Prozac, Effexor - didn't think any of these worked.  Has been on Wellbutrin for approximately 3 years.      Family Psychiatric History:  Denies    Substance Use/Addiction History:  Alcohol:  Denies  Cannabis:  about twice a month smokes to fall asleep  Tobacco:  Denies  Caffeine:  4 cups of coffee/day, update 3/6/23 - 3 cups/day 1 AM 2 afternoon  Other:  Denies any other substances    Social History:  He was born in [place], moved to [place] approx age 3, enjoyed school, denies any hx of abuse or trauma, masters degree in social work, worked for the [employer] [place] until approx. 2011 when there were budget cuts and he was moved to part-time, moved to [place] to be close to his brother who took a job in [place].  Used to enjoy skiing and hunting.  Never .  " Served in the [employer] for 3 years and then the [email or web address].    Allergies:  Pcn [penicillins]      Physical Examination and Mental Status Exam:  Vital signs: There were no vitals taken for this visit.    CONSTITUTIONAL:  General Appearance:  Casual attire, good eye contact, engaged with provider    ORIENTATION:  Oriented to time, place and person  RECENT AND REMOTE MEMORY:  Grossly intact  ATTENTION SPAN AND CONCENTRATION:  within normal range  LANGUAGE:  no deficits appreciated  FUND OF KNOWLEDGE:  has awareness of current events, past history and normal vocabulary  SPEECH:  normal volume, amount, rate and articulation, no perseveration or paucity of language  MOOD:  Euthymic  AFFECT:  Full range  THOUGHT PROCESS:  logical and goal directed  THOUGHT CONTENT:  Denies any SI/HI or AVH, no delusional thinking nor preoccupations appreciated  ASSOCIATIONS:  Intact, not loose, no tangentiality or circumstantiality  MEMORY:  No gross evidence of memory deficits  JUDGMENT:  adequate concerning everyday activities  INSIGHT:  adequate to psychiatric condition    DIAGNOSTIC IMPRESSION:  1. ADHD (attention deficit hyperactivity disorder), inattentive type  - amphetamine-dextroamphetamine (ADDERALL, 10MG,) 10 MG Tab; Take 1.5 Tablets by mouth 3 times a day as needed (Attention and Focus) for up to 30 days.  Dispense: 135 Tablet; Refill: 0    2. MDD (major depressive disorder), recurrent severe, without psychosis (HCC)  - amphetamine-dextroamphetamine (ADDERALL, 10MG,) 10 MG Tab; Take 1.5 Tablets by mouth 3 times a day as needed (Attention and Focus) for up to 30 days.  Dispense: 135 Tablet; Refill: 0         Assessment and Plan:  The patient's risk of suicide is assessed as low.  Will be mindful he has had 2 ischemic strokes that affected him cognitively, reduced IQ by 30 points first stroke and he is on Coumadin.  1.  MDD, recurrent, severe, in remission with medication  ADHD, stable  R/o MCI  SAMMY, uncontrolled  MCI, no  change  CONCUSSION with LOC APPROX MARCH/April 2023 - FELL ON SNOW/ICE AND HIT HEAD  Got neuropsych testing following stroke in 2008/09, down from 131 to 100  Fatigue/low energy, resolved  Improve diet - sent article on Flavanol  FOLLOW UP ON WHY HE ISN'T USING CPAP FOR HIS SAMMY  Has BP cuff to monitor BP while on Adderall.  He will provide reading next visit  Previously educated him about MIND diet for protective properties against Dementia  Hold, VA wouldn't approve and now not needed: Begin Trintellix 10 mg, MDD  Continue Adderall 15 mg TID, from 10 mg TID, ADHD and off lable for MDD, increased 11/21/22  Continue Wellbutrin  mg BID  Previously educated him about Auvelity, and he can add Dextromethorphan OTC 45 mg BID along with the Wellbutrtin, for MDD  Previously educated him about sleep music for falling asleep  Will be mindful he smokes MJ a couple of times a month to help him fall asleep  Encouraged him to make an appt with a therapist [facility] the [facility]  Initial visit  PHQ9, score 20, score 3/6/23 0  Reviewed prior visit HPI, histories and treatment plan in preparation for today's visit  Reviewed [place] PDMP      2.  The patient has a safety plan that includes calling the 1-800 crisis line number, calling 911 and/or going to the nearest Emergency Department if symptoms worsen.    3.  Risks, benefits, alternatives and side effects were discussed for all medicines prescribed at this visit.  The patient voiced understanding providing informed consent.  The patient agrees to call the clinic with any questions or concerns, or seek emergent medical care if warranted.    4.  Follow up in 12 weeks or call sooner PRN    The proposed treatment plan was discussed with the patient who was provided the opportunity to ask questions and make suggestions regarding alternative treatment. Patient verbalized understanding and expressed agreement with the plan.     Greater than 16 minutes of the visit was spent in psychotherapy.      Psychotherapy include:  Supportive psychotherapy and psychoeducation, topics: [place] law not being changed.  [place] law where he gets 66% less of his state pension b/c of SSN benefits.  Working 5 days per week now instead of 4 - benefits of this.        Holly Wylie M.D.      This note was created using voice recognition software (Dragon). The accuracy of the dictation is limited by the abilities of the software. I have reviewed the note prior to signing, however some errors in grammar and context are still possible. If you have any questions related to this note please do not hesitate to contact our office.

## 2023-07-11 DIAGNOSIS — F33.2 MDD (MAJOR DEPRESSIVE DISORDER), RECURRENT SEVERE, WITHOUT PSYCHOSIS (HCC): ICD-10-CM

## 2023-07-11 DIAGNOSIS — F90.0 ADHD (ATTENTION DEFICIT HYPERACTIVITY DISORDER), INATTENTIVE TYPE: ICD-10-CM

## 2023-07-11 NOTE — TELEPHONE ENCOUNTER
Please re-send to pharmacy.     Received request via: Patient    Was the patient seen in the last year in this department? Yes    Does the patient have an active prescription (recently filled or refills available) for medication(s) requested? No    Does the patient have shelter Plus and need 100 day supply (blood pressure, diabetes and cholesterol meds only)? Medication is not for cholesterol, blood pressure or diabetes and Patient does not have SCP

## 2023-07-12 RX ORDER — DEXTROAMPHETAMINE SACCHARATE, AMPHETAMINE ASPARTATE, DEXTROAMPHETAMINE SULFATE AND AMPHETAMINE SULFATE 2.5; 2.5; 2.5; 2.5 MG/1; MG/1; MG/1; MG/1
15 TABLET ORAL 3 TIMES DAILY PRN
Qty: 135 TABLET | Refills: 0 | Status: SHIPPED | OUTPATIENT
Start: 2023-07-12 | End: 2023-08-07 | Stop reason: SDUPTHER

## 2023-08-07 DIAGNOSIS — F33.2 MDD (MAJOR DEPRESSIVE DISORDER), RECURRENT SEVERE, WITHOUT PSYCHOSIS (HCC): ICD-10-CM

## 2023-08-07 DIAGNOSIS — F90.0 ADHD (ATTENTION DEFICIT HYPERACTIVITY DISORDER), INATTENTIVE TYPE: ICD-10-CM

## 2023-08-07 RX ORDER — DEXTROAMPHETAMINE SACCHARATE, AMPHETAMINE ASPARTATE, DEXTROAMPHETAMINE SULFATE AND AMPHETAMINE SULFATE 2.5; 2.5; 2.5; 2.5 MG/1; MG/1; MG/1; MG/1
15 TABLET ORAL 3 TIMES DAILY PRN
Qty: 135 TABLET | Refills: 0 | Status: SHIPPED | OUTPATIENT
Start: 2023-08-11 | End: 2023-09-10

## 2023-08-07 NOTE — TELEPHONE ENCOUNTER
Received request via: Pharmacy    Was the patient seen in the last year in this department? Yes    Does the patient have an active prescription (recently filled or refills available) for medication(s) requested? No    Does the patient have long-term Plus and need 100 day supply (blood pressure, diabetes and cholesterol meds only)? Medication is not for cholesterol, blood pressure or diabetes and Patient does not have SCP

## 2023-09-11 ENCOUNTER — TELEMEDICINE (OUTPATIENT)
Dept: BEHAVIORAL HEALTH | Facility: CLINIC | Age: 68
End: 2023-09-11
Payer: COMMERCIAL

## 2023-09-11 DIAGNOSIS — F33.2 MDD (MAJOR DEPRESSIVE DISORDER), RECURRENT SEVERE, WITHOUT PSYCHOSIS (HCC): ICD-10-CM

## 2023-09-11 DIAGNOSIS — F90.0 ADHD (ATTENTION DEFICIT HYPERACTIVITY DISORDER), INATTENTIVE TYPE: ICD-10-CM

## 2023-09-11 DIAGNOSIS — F90.0 ATTENTION DEFICIT HYPERACTIVITY DISORDER (ADHD), PREDOMINANTLY INATTENTIVE TYPE: ICD-10-CM

## 2023-09-11 PROCEDURE — 99214 OFFICE O/P EST MOD 30 MIN: CPT | Mod: 95 | Performed by: PSYCHIATRY & NEUROLOGY

## 2023-09-11 RX ORDER — DEXTROAMPHETAMINE SACCHARATE, AMPHETAMINE ASPARTATE, DEXTROAMPHETAMINE SULFATE AND AMPHETAMINE SULFATE 3.75; 3.75; 3.75; 3.75 MG/1; MG/1; MG/1; MG/1
15 TABLET ORAL 3 TIMES DAILY
Qty: 90 TABLET | Refills: 0 | Status: SHIPPED | OUTPATIENT
Start: 2023-11-11 | End: 2023-09-13 | Stop reason: SDUPTHER

## 2023-09-11 RX ORDER — DEXTROAMPHETAMINE SACCHARATE, AMPHETAMINE ASPARTATE, DEXTROAMPHETAMINE SULFATE AND AMPHETAMINE SULFATE 3.75; 3.75; 3.75; 3.75 MG/1; MG/1; MG/1; MG/1
15 TABLET ORAL 3 TIMES DAILY
Qty: 90 TABLET | Refills: 0 | Status: SHIPPED | OUTPATIENT
Start: 2023-09-11 | End: 2023-09-19 | Stop reason: SDUPTHER

## 2023-09-11 RX ORDER — DEXTROAMPHETAMINE SACCHARATE, AMPHETAMINE ASPARTATE, DEXTROAMPHETAMINE SULFATE AND AMPHETAMINE SULFATE 3.75; 3.75; 3.75; 3.75 MG/1; MG/1; MG/1; MG/1
15 TABLET ORAL 3 TIMES DAILY
Qty: 90 TABLET | Refills: 0 | Status: SHIPPED | OUTPATIENT
Start: 2023-10-12 | End: 2023-11-02

## 2023-09-11 RX ORDER — BUPROPION HYDROCHLORIDE 150 MG/1
150 TABLET, EXTENDED RELEASE ORAL 2 TIMES DAILY
Qty: 180 TABLET | Refills: 1 | Status: SHIPPED | OUTPATIENT
Start: 2023-09-11 | End: 2024-02-06 | Stop reason: SDUPTHER

## 2023-09-11 NOTE — PROGRESS NOTES
[facility] AT [facility]  PSYCHIATRIC FOLLOW-UP NOTE    This evaluation was conducted via Zoom, using secure and encrypted videoconferencing technology. The patient was physically located at their home address in [place], [place], and the physician was located at her home office in [place], [place]. The patient was presented by self. The patient’s identity was confirmed and verbal consent for the telemedicine encounter was obtained.    CC:  Presents for follow up visit for medication evaluation and management        History Of Present Illness:  Hudson Fitch is a 68 y.o. old male with history of ADHD, Inattentive Type and MDD, and SAMMY - not complaint with CPAP, hx of ischemic stroke in 2010 and says neuropsych testing showed his IQ dropped 30 pts and then he had a second [facility] in 2012[facility], presents today for a follow up visit.    The patient reported the following:  Approx March 2023: He fell on the snow and hit the back of his head a couple of months ago, knocked unconscious and no memory of it, wasn't able to work out for a couple of months, was working out at home.  He continues to do well.  He is now working 5 days a week 4 hours each of those days.  Attention and focus is good.  Says his SAMMY is mild and that he feels better not using his CPAP.  He states his blood pressure has been good.  Denies any medication side effects.  He is still working on his weight loss, is at approximately 219 pounds now and would like to get down to 190 pounds.  He has lost approximately 14 pounds.      had neuropsych testing in 2008/09 following his strokes and that's where he learned his IQ went from 131 to 100.      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  He took a higher dose of Adderall  "in the past and it did help.    History from 10/24/22 visit: \"He was diagnosed approx 2001 with ADHD after working as a  and evaluating children for ADHD.  He realized he struggled with the symptoms.  He endorses all the inattentive symptoms but denies hyperactive or impulsive symptoms.  He takes Adderall 10 mg TID.  [facility]    MDD:  He takes Wellbutrin 150 mg BID.  He endorses anhedonia, feeling depressed and down but denies feelings of hopelessness.  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  He watches a lot of TV, news, and doesn't have the motivation to do much more.  He does play pickle ball once a week.  He feels tired all the time and has low energy.  He sleeps pretty well, no problems falling asleep, despite the stimulant, wellbutrin and drinking 4 cups of coffee/day.  F/u on snoring next visit.  He has gained 20 lbs approx. over the last year.  He moves more slowly and talks less than he did in the past.\"    ROS: As noted above in HPI.        Past Psychiatric History:  Denies any hospitalizations  Denies any history of SI, SA or self harm  Medication trials:  Lexapro, Zoloft, Prozac, Effexor - didn't think any of these worked.  Has been on Wellbutrin for approximately 3 years.      Family Psychiatric History:  Denies    Substance Use/Addiction History:  Alcohol:  Denies  Cannabis:  about twice a month smokes to fall asleep  Tobacco:  Denies  Caffeine:  4 cups of coffee/day, update 3/6/23 - 3 cups/day 1 AM 2 afternoon  Other:  Denies any other substances    Social History:  He was born in [place], moved to [place] approx age 3, enjoyed school, denies any hx of abuse or trauma, masters degree in social work, worked for the [employer] until approx. 2011 when there were budget cuts and he was moved to " part-time, moved to [place] to be close to his brother who took a job in [place].  Used to enjoy skiing and hunting.  Never .  Served in the [employer] for 3 years and then the [employer].    Allergies:  Pcn [penicillins]      Physical Examination and Mental Status Exam:  Vital signs: There were no vitals taken for this visit.    CONSTITUTIONAL:  General Appearance:  Casual attire, good eye contact, engaged with provider    ORIENTATION:  Oriented to time, place and person  RECENT AND REMOTE MEMORY:  Grossly intact  ATTENTION SPAN AND CONCENTRATION:  within normal range  LANGUAGE:  no deficits appreciated  FUND OF KNOWLEDGE:  has awareness of current events, past history and normal vocabulary  SPEECH:  normal volume, amount, rate and articulation, no perseveration or paucity of language  MOOD:  Neutral  AFFECT:  Mood congruent  THOUGHT PROCESS:  logical and goal directed  THOUGHT CONTENT:  Denies any SI/HI or AVH, no delusional thinking nor preoccupations appreciated  ASSOCIATIONS:  Intact, not loose, no tangentiality or circumstantiality  MEMORY:  No gross evidence of memory deficits  JUDGMENT:  adequate concerning everyday activities  INSIGHT:  adequate to psychiatric condition    DIAGNOSTIC IMPRESSION:  1. ADHD (attention deficit hyperactivity disorder), inattentive type  - amphetamine-dextroamphetamine (ADDERALL, 15MG,) 15 MG tablet; Take 1 Tablet by mouth 3 times a day for 30 days.  Dispense: 90 Tablet; Refill: 0  - amphetamine-dextroamphetamine (ADDERALL, 15MG,) 15 MG tablet; Take 1 Tablet by mouth 3 times a day for 30 days.  Dispense: 90 Tablet; Refill: 0  - amphetamine-dextroamphetamine (ADDERALL, 15MG,) 15 MG tablet; Take 1 Tablet by mouth 3 times a day for 30 days.  Dispense: 90 Tablet; Refill: 0    2. MDD (major depressive disorder), recurrent severe, without psychosis (HCC)  - buPROPion SR (WELLBUTRIN-SR) 150 MG TABLET SR 12 HR sustained-release tablet; Take 1 Tablet by mouth 2 times a day.  Dispense: 180  Tablet; Refill: 1    3. Attention deficit hyperactivity disorder (ADHD), predominantly inattentive type  - buPROPion SR (WELLBUTRIN-SR) 150 MG TABLET SR 12 HR sustained-release tablet; Take 1 Tablet by mouth 2 times a day.  Dispense: 180 Tablet; Refill: 1         Assessment and Plan:  The patient's risk of suicide is assessed as low.  Will be mindful he has had 2 ischemic strokes that affected him cognitively, reduced IQ by 30 points first stroke and he is on Coumadin.  1.  MDD, recurrent, severe, stable  ADHD, stable  R/o MCI  SAMMY, mild, states he feels better without using his CPAP - used it in the past  MCI, no change  CONCUSSION with LOC APPROX MARCH/April 2023 - FELL ON SNOW/ICE AND HIT HEAD  Got neuropsych testing following stroke in 2008/09, down from 131 to 100  Fatigue/low energy, resolved  Improve diet - previously sent article on Flavanol  Has BP cuff to monitor BP while on Adderall.  He will provide reading next visit  Previously educated him about MIND diet for protective properties against Dementia  Hold, [facility] wouldn't approve and now not needed: Begin Trintellix 10 mg, MDD  Continue Adderall 15 mg TID, from 10 mg TID, ADHD and off lable for MDD, increased 11/21/22  Continue Wellbutrin  mg BID  Previously educated him about Auvelity, and he can add Dextromethorphan OTC 45 mg BID along with the Wellbutrtin, for MDD  Previously educated him about sleep music for falling asleep  Will be mindful he smokes MJ a couple of times a month to help him fall asleep  Encouraged him to make an appt with a therapist through the [facility]  Initial visit  PHQ9, score 20, score 3/6/23 0  Reviewed prior visit HPI, histories and treatment plan in preparation for today's visit  Reviewed [place] PDMP  Complete VA form for continuation of care when it is due - approx October 2.  The patient has a safety plan that includes calling the 3-682 crisis line number, calling 911 and/or going to the nearest Emergency Department if  symptoms worsen.    3.  Risks, benefits, alternatives and side effects were discussed for all medicines prescribed at this visit.  The patient voiced understanding providing informed consent.  The patient agrees to call the clinic with any questions or concerns, or seek emergent medical care if warranted.    4.  Follow up in 12 weeks or call sooner PRN    The proposed treatment plan was discussed with the patient who was provided the opportunity to ask questions and make suggestions regarding alternative treatment. Patient verbalized understanding and expressed agreement with the plan.       Holly Wylie M.D.      This note was created using voice recognition software (Dragon). The accuracy of the dictation is limited by the abilities of the software. I have reviewed the note prior to signing, however some errors in grammar and context are still possible. If you have any questions related to this note please do not hesitate to contact our office.

## 2023-09-13 DIAGNOSIS — F90.0 ADHD (ATTENTION DEFICIT HYPERACTIVITY DISORDER), INATTENTIVE TYPE: ICD-10-CM

## 2023-09-13 NOTE — TELEPHONE ENCOUNTER
[facility] from the [facility] stating that they do not stock Adderall IR 10 mg tabs. They are req a new script for Adderall 15 mg.    Received request via: Pharmacy    Was the patient seen in the last year in this department? Yes    Does the patient have an active prescription (recently filled or refills available) for medication(s) requested? No    Does the patient have detention Plus and need 100 day supply (blood pressure, diabetes and cholesterol meds only)? Medication is not for cholesterol, blood pressure or diabetes and Patient does not have SCP

## 2023-09-14 RX ORDER — DEXTROAMPHETAMINE SACCHARATE, AMPHETAMINE ASPARTATE, DEXTROAMPHETAMINE SULFATE AND AMPHETAMINE SULFATE 3.75; 3.75; 3.75; 3.75 MG/1; MG/1; MG/1; MG/1
15 TABLET ORAL 3 TIMES DAILY
Qty: 90 TABLET | Refills: 0 | Status: SHIPPED | OUTPATIENT
Start: 2023-11-11 | End: 2023-09-20

## 2023-09-19 DIAGNOSIS — F90.0 ADHD (ATTENTION DEFICIT HYPERACTIVITY DISORDER), INATTENTIVE TYPE: ICD-10-CM

## 2023-09-19 RX ORDER — DEXTROAMPHETAMINE SACCHARATE, AMPHETAMINE ASPARTATE, DEXTROAMPHETAMINE SULFATE AND AMPHETAMINE SULFATE 3.75; 3.75; 3.75; 3.75 MG/1; MG/1; MG/1; MG/1
15 TABLET ORAL 3 TIMES DAILY
Qty: 90 TABLET | Refills: 0 | Status: SHIPPED | OUTPATIENT
Start: 2023-09-19 | End: 2023-09-20

## 2023-09-20 DIAGNOSIS — F90.0 ADHD (ATTENTION DEFICIT HYPERACTIVITY DISORDER), INATTENTIVE TYPE: ICD-10-CM

## 2023-09-20 RX ORDER — DEXTROAMPHETAMINE SACCHARATE, AMPHETAMINE ASPARTATE, DEXTROAMPHETAMINE SULFATE AND AMPHETAMINE SULFATE 2.5; 2.5; 2.5; 2.5 MG/1; MG/1; MG/1; MG/1
15 TABLET ORAL 3 TIMES DAILY PRN
Qty: 135 TABLET | Refills: 0 | Status: SHIPPED | OUTPATIENT
Start: 2023-09-20 | End: 2023-10-31 | Stop reason: SDUPTHER

## 2023-10-31 DIAGNOSIS — F90.0 ADHD (ATTENTION DEFICIT HYPERACTIVITY DISORDER), INATTENTIVE TYPE: ICD-10-CM

## 2023-10-31 RX ORDER — DEXTROAMPHETAMINE SACCHARATE, AMPHETAMINE ASPARTATE, DEXTROAMPHETAMINE SULFATE AND AMPHETAMINE SULFATE 2.5; 2.5; 2.5; 2.5 MG/1; MG/1; MG/1; MG/1
15 TABLET ORAL 3 TIMES DAILY PRN
Qty: 135 TABLET | Refills: 0 | Status: SHIPPED | OUTPATIENT
Start: 2023-10-31 | End: 2023-12-04 | Stop reason: SDUPTHER

## 2023-10-31 NOTE — TELEPHONE ENCOUNTER
Received request via: Patient    Was the patient seen in the last year in this department? Yes    Does the patient have an active prescription (recently filled or refills available) for medication(s) requested? No    Does the patient have group home Plus and need 100 day supply (blood pressure, diabetes and cholesterol meds only)? Medication is not for cholesterol, blood pressure or diabetes and Patient does not have SCP

## 2023-11-02 ENCOUNTER — TELEMEDICINE (OUTPATIENT)
Dept: BEHAVIORAL HEALTH | Facility: CLINIC | Age: 68
End: 2023-11-02
Payer: COMMERCIAL

## 2023-11-02 DIAGNOSIS — F90.0 ADHD (ATTENTION DEFICIT HYPERACTIVITY DISORDER), INATTENTIVE TYPE: ICD-10-CM

## 2023-11-02 DIAGNOSIS — F33.2 MDD (MAJOR DEPRESSIVE DISORDER), RECURRENT SEVERE, WITHOUT PSYCHOSIS (HCC): ICD-10-CM

## 2023-11-02 PROCEDURE — 99214 OFFICE O/P EST MOD 30 MIN: CPT | Mod: 95 | Performed by: PSYCHIATRY & NEUROLOGY

## 2023-11-02 NOTE — PROGRESS NOTES
"[facility] AT [facility]  PSYCHIATRIC FOLLOW-UP NOTE    This evaluation was conducted via Zoom, using secure and encrypted videoconferencing technology. The patient was physically located at their home address in [place], [place], and the physician was located at her home office in [place], [place]. The patient was presented by self. The patient’s identity was confirmed and verbal consent for the telemedicine encounter was obtained.    CC:  Presents for follow up visit for medication evaluation and management        History Of Present Illness:  Hudson Fitch is a 68 y.o. old male with history of ADHD, Inattentive Type and MDD, and SAMMY - not complaint with CPAP, hx of ischemic stroke in 2010 and says neuropsych testing showed his IQ dropped 30 pts and then he had a second stroke in 2012, [facility], presents today for a follow up visit.    The patient reported the following:  Approx March 2023: He fell on the snow and hit the back of his head a couple of months ago, knocked unconscious and no memory of it, wasn't able to work out for a couple of months, was working out at home.  He has reduced the amount of time he has watched TV, used to turn it on 1st thing in AM.  Has continued to lose weight and is pleased with his progress, has more energy, has been lifting weights at home, has lost 24 lbs, is 22 lbs from his [employer] weight and this is his goal.  Is sleeping better.  Has gotten his COVID vaccine, had a bout of flu-like symptoms recently.  Work is going well, still working 5 days/week 4 hours each of those days.  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  Has continued the Wellbutrin  mg BID and the Adderall 15 mg TID.  Denies any medication SE, no chest pain or palpitations, HA, or dizziness.  He has dentures.    had neuropsych testing in 2008/09 following his strokes and that's where he learned his IQ went from 131 to 100.      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  He took a higher dose of Adderall in the past and it did help.    History from 10/24/22 visit: \"He was diagnosed approx 2001 with ADHD after working as a  and evaluating children for ADHD.  He realized he struggled with the symptoms.  He endorses all the inattentive symptoms but denies hyperactive or impulsive symptoms.  He takes Adderall 10 mg TID.  [facility]    MDD:  He takes Wellbutrin 150 mg BID.  He endorses anhedonia, feeling depressed and down but denies feelings of hopelessness.  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  He watches a lot of TV, news, and doesn't have the motivation to do much more.  He does play pickle ball once a week.  He feels tired all the time and has low energy.  He sleeps pretty well, no problems falling asleep, despite the stimulant, wellbutrin and drinking 4 cups of coffee/day.  F/u on snoring next visit.  He has gained 20 lbs approx. over the last year.  He moves more slowly and talks less than he did in the past.\"    ROS: As noted above in HPI.        Past Psychiatric History:  Denies any hospitalizations  Denies any history of SI, SA or self harm  Medication trials:  Lexapro, Zoloft, Prozac, Effexor - didn't think any of these worked.  Has been on Wellbutrin for approximately 3 years.      Family Psychiatric History:  Denies    Substance Use/Addiction History:  Alcohol:  Denies  Cannabis:  about twice a month smokes to fall asleep  Tobacco:  Denies  Caffeine:  4 cups of coffee/day, update 3/6/23 - 3 cups/day 1 AM 2 afternoon  Other:  Denies any " other substances    Social History:  He was born in [place], moved to [place] approx age 3, enjoyed school, denies any hx of abuse or trauma, masters degree in social work, worked for the [employer] until approx. 2011 when there were budget cuts and he was moved to part-time, moved to [place] to be close to his brother who took a job in [place].  Used to enjoy skiing and hunting.  Never .  Served in the [employer] for 3 years and then the [employer].    Allergies:  Pcn [penicillins]      Physical Examination and Mental Status Exam:  Vital signs: There were no vitals taken for this visit.    CONSTITUTIONAL:  General Appearance:  Casual attire, good eye contact, engaged with provider    ORIENTATION:  Oriented to time, place and person  RECENT AND REMOTE MEMORY:  Grossly intact  ATTENTION SPAN AND CONCENTRATION:  within normal range  LANGUAGE:  no deficits appreciated  FUND OF KNOWLEDGE:  has awareness of current events, past history and normal vocabulary  SPEECH:  normal volume, amount, rate and articulation, no perseveration or paucity of language  MOOD:  Neutral  AFFECT:  Mood congruent  THOUGHT PROCESS:  logical and goal directed  THOUGHT CONTENT:  Denies any SI/HI or AVH, no delusional thinking nor preoccupations appreciated  ASSOCIATIONS:  Intact, not loose, no tangentiality or circumstantiality  MEMORY:  No gross evidence of memory deficits  JUDGMENT:  adequate concerning everyday activities  INSIGHT:  adequate to psychiatric condition    DIAGNOSTIC IMPRESSION:  1. ADHD (attention deficit hyperactivity disorder), inattentive type         Assessment and Plan:  The patient's risk of suicide is assessed as low.  Will be mindful he has had 2 ischemic strokes that affected him cognitively, reduced IQ by 30 points first stroke and he is on Coumadin.  1.  MDD, recurrent, severe, stable  ADHD, stable  R/o MCI  SAMMY, mild, may be improving with his weight loss, states he feels better without using his CPAP - used it in the  past  MCI, no change  CONCUSSION with LOC APPROX MARCH/April 2023 - FELL ON SNOW/ICE AND HIT HEAD  Got neuropsych testing following stroke in 2008/09, down from 131 to 100  Fatigue/low energy, resolved  Improve diet - previously sent article on Flavanol  Has BP cuff to monitor BP while on Adderall.  Previously educated him about MIND diet for protective properties against Dementia  Hold, [facility] wouldn't approve and now not needed: Begin Trintellix 10 mg, MDD  Continue Adderall 15 mg TID, from 10 mg TID, ADHD and off lable for MDD, increased 11/21/22  Continue Wellbutrin  mg BID  Previously educated him about Auvelity, and he can add Dextromethorphan OTC 45 mg BID along with the Wellbutrtin, for MDD  Previously educated him about sleep music for falling asleep  Will be mindful he smokes MJ a couple of times a month to help him fall asleep  Encouraged him to make an appt with a therapist through the [facility]  Initial visit  PHQ9, score 20, score 3/6/23 0  Reviewed prior visit HPI, histories and treatment plan in preparation for today's visit  Reviewed [place] PDMP  Complete VA form for continuation of care when it is due - approx October      2.  The patient has a safety plan that includes calling the 1-800 crisis line number, calling 911 and/or going to the nearest Emergency Department if symptoms worsen.    3.  Risks, benefits, alternatives and side effects were discussed for all medicines prescribed at this visit.  The patient voiced understanding providing informed consent.  The patient agrees to call the clinic with any questions or concerns, or seek emergent medical care if warranted.    4.  Follow up in 12 weeks or call sooner PRN    The proposed treatment plan was discussed with the patient who was provided the opportunity to ask questions and make suggestions regarding alternative treatment. Patient verbalized understanding and expressed agreement with the plan.       Holly Wylie M.D.      This note was created  using voice recognition software (Dragon). The accuracy of the dictation is limited by the abilities of the software. I have reviewed the note prior to signing, however some errors in grammar and context are still possible. If you have any questions related to this note please do not hesitate to contact our office.

## 2023-12-04 DIAGNOSIS — F90.0 ADHD (ATTENTION DEFICIT HYPERACTIVITY DISORDER), INATTENTIVE TYPE: ICD-10-CM

## 2023-12-05 RX ORDER — DEXTROAMPHETAMINE SACCHARATE, AMPHETAMINE ASPARTATE, DEXTROAMPHETAMINE SULFATE AND AMPHETAMINE SULFATE 2.5; 2.5; 2.5; 2.5 MG/1; MG/1; MG/1; MG/1
15 TABLET ORAL 3 TIMES DAILY PRN
Qty: 135 TABLET | Refills: 0 | Status: SHIPPED | OUTPATIENT
Start: 2023-12-05 | End: 2024-01-04 | Stop reason: SDUPTHER

## 2024-01-04 DIAGNOSIS — F90.0 ADHD (ATTENTION DEFICIT HYPERACTIVITY DISORDER), INATTENTIVE TYPE: ICD-10-CM

## 2024-01-04 RX ORDER — DEXTROAMPHETAMINE SACCHARATE, AMPHETAMINE ASPARTATE, DEXTROAMPHETAMINE SULFATE AND AMPHETAMINE SULFATE 2.5; 2.5; 2.5; 2.5 MG/1; MG/1; MG/1; MG/1
15 TABLET ORAL 3 TIMES DAILY PRN
Qty: 135 TABLET | Refills: 0 | Status: SHIPPED | OUTPATIENT
Start: 2024-01-06 | End: 2024-02-05

## 2024-02-06 ENCOUNTER — TELEMEDICINE (OUTPATIENT)
Dept: BEHAVIORAL HEALTH | Facility: CLINIC | Age: 69
End: 2024-02-06
Payer: COMMERCIAL

## 2024-02-06 DIAGNOSIS — F90.0 ATTENTION DEFICIT HYPERACTIVITY DISORDER (ADHD), PREDOMINANTLY INATTENTIVE TYPE: ICD-10-CM

## 2024-02-06 DIAGNOSIS — F90.0 ADHD (ATTENTION DEFICIT HYPERACTIVITY DISORDER), INATTENTIVE TYPE: ICD-10-CM

## 2024-02-06 DIAGNOSIS — F33.2 MDD (MAJOR DEPRESSIVE DISORDER), RECURRENT SEVERE, WITHOUT PSYCHOSIS (HCC): ICD-10-CM

## 2024-02-06 PROCEDURE — 96127 BRIEF EMOTIONAL/BEHAV ASSMT: CPT | Mod: 95 | Performed by: PSYCHIATRY & NEUROLOGY

## 2024-02-06 PROCEDURE — 99214 OFFICE O/P EST MOD 30 MIN: CPT | Mod: 95 | Performed by: PSYCHIATRY & NEUROLOGY

## 2024-02-06 RX ORDER — DEXTROAMPHETAMINE SACCHARATE, AMPHETAMINE ASPARTATE, DEXTROAMPHETAMINE SULFATE AND AMPHETAMINE SULFATE 2.5; 2.5; 2.5; 2.5 MG/1; MG/1; MG/1; MG/1
15 TABLET ORAL 3 TIMES DAILY PRN
Qty: 135 TABLET | Refills: 0 | Status: SHIPPED | OUTPATIENT
Start: 2024-02-06 | End: 2024-03-07

## 2024-02-06 RX ORDER — DEXTROAMPHETAMINE SACCHARATE, AMPHETAMINE ASPARTATE, DEXTROAMPHETAMINE SULFATE AND AMPHETAMINE SULFATE 2.5; 2.5; 2.5; 2.5 MG/1; MG/1; MG/1; MG/1
15 TABLET ORAL 3 TIMES DAILY PRN
Qty: 135 TABLET | Refills: 0 | Status: SHIPPED | OUTPATIENT
Start: 2024-03-07 | End: 2024-03-07 | Stop reason: SDUPTHER

## 2024-02-06 RX ORDER — DEXTROAMPHETAMINE SACCHARATE, AMPHETAMINE ASPARTATE, DEXTROAMPHETAMINE SULFATE AND AMPHETAMINE SULFATE 2.5; 2.5; 2.5; 2.5 MG/1; MG/1; MG/1; MG/1
15 TABLET ORAL 3 TIMES DAILY PRN
Qty: 135 TABLET | Refills: 0 | Status: SHIPPED | OUTPATIENT
Start: 2024-04-07 | End: 2024-05-07

## 2024-02-06 RX ORDER — BUPROPION HYDROCHLORIDE 150 MG/1
150 TABLET, EXTENDED RELEASE ORAL 2 TIMES DAILY
Qty: 180 TABLET | Refills: 1 | Status: SHIPPED | OUTPATIENT
Start: 2024-02-06

## 2024-02-06 ASSESSMENT — PATIENT HEALTH QUESTIONNAIRE - PHQ9
SUM OF ALL RESPONSES TO PHQ QUESTIONS 1-9: 3
CLINICAL INTERPRETATION OF PHQ2 SCORE: 2
5. POOR APPETITE OR OVEREATING: 0 - NOT AT ALL

## 2024-02-06 NOTE — PROGRESS NOTES
[facility] AT [facility]  PSYCHIATRIC FOLLOW-UP NOTE    This evaluation was conducted via Zoom, using secure and encrypted videoconferencing technology. The patient was physically located at their home address in [place], [place], and the physician was located at her home office in [place], [place]. The patient was presented by self. The patient’s identity was confirmed and verbal consent for the telemedicine encounter was obtained.    CC:  Presents for follow up visit for medication evaluation and management        History Of Present Illness:  Hudson Fitch is a 68 y.o. old male with history of ADHD, Inattentive Type and MDD, and SAMMY - not complaint with CPAP, hx of ischemic stroke in 2010 and says neuropsych testing showed his IQ dropped 30 pts and then he had a second stroke in 2012, [facility], presents today for a follow up visit.    The patient reported the following:  Approx March 2023: He fell on the snow and hit the back of his head a couple of months ago, knocked unconscious and no memory of it, wasn't able to work out for a couple of months, was working out at home.  Hasn't been working out at home like he was, at least not for the last couple of weeks, no more weight loss but no weight gain.  Medications working well for his depression and ADHD, it's been a cloudy winter and this impacts his mood and energy, same as last winter, and so doesn't feel like doing much on the weekends.  Still loves his job at the [employer], working 5 days/week 4 hours each of those days, considers the participants his friends and really enjoys them.  Hasn't measured his BP recently, agreed to do so.  Denies any medication SE.      Had neuropsych testing in 2008/09 following his strokes and that's where he learned his IQ went from 131 to 100.      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  He took a higher dose of Adderall in the past and it did help.    History from 10/24/22 visit: \"He was diagnosed approx 2001 with ADHD after working as a  and evaluating children for ADHD.  He realized he struggled with the symptoms.  He endorses all the inattentive symptoms but denies hyperactive or impulsive symptoms.  He takes Adderall 10 mg TID.  [facility]    MDD:  He takes Wellbutrin 150 mg BID.  He endorses anhedonia, feeling depressed and down but denies feelings of hopelessness.  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  He watches a lot of TV, news, and doesn't have the motivation to do much more.  He does play pickle ball once a week.  He feels tired all the time and has low energy.  He sleeps pretty well, no problems falling asleep, despite the stimulant, wellbutrin and drinking 4 cups of coffee/day.  F/u on snoring next visit.  He has gained 20 lbs approx. over the last year.  He moves more slowly and talks less than he did in the past.\"    ROS: As noted above in HPI.        Past Psychiatric History:  Denies any hospitalizations  Denies any history of SI, SA or self harm  Medication trials:  Lexapro, Zoloft, Prozac, Effexor - didn't think any of these worked.  Has been on Wellbutrin for approximately 3 years.      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 2011 when there were budget cuts and he was moved to part-time, moved to [place] to be close to his brother who took a job in [place].  Used to enjoy skiing and hunting.  Never .  Served in the [employer] for 3 years and then the [employer].    Allergies:  Pcn [penicillins]      Physical Examination and Mental Status Exam:  Vital signs: There were no vitals taken for this visit.    CONSTITUTIONAL:  General Appearance:  Casual attire, good eye contact, engaged with provider    ORIENTATION:  Oriented to time, place and person  RECENT AND REMOTE MEMORY:  Grossly intact  ATTENTION SPAN AND CONCENTRATION:  within normal range  LANGUAGE:  no deficits appreciated  FUND OF KNOWLEDGE:  has awareness of current events, past history and normal vocabulary  SPEECH:  normal volume, amount, rate and articulation, no perseveration or paucity of language  MOOD:  Neutral  AFFECT:  Mood congruent  THOUGHT PROCESS:  logical and goal directed  THOUGHT CONTENT:  Denies any SI/HI or AVH, no delusional thinking nor preoccupations appreciated  ASSOCIATIONS:  Intact, not loose, no tangentiality or circumstantiality  MEMORY:  No gross evidence of memory deficits  JUDGMENT:  adequate concerning everyday activities  INSIGHT:  adequate to psychiatric condition    DIAGNOSTIC IMPRESSION:  1. ADHD (attention deficit hyperactivity disorder), inattentive type  - Controlled Substance Treatment Agreement  - amphetamine-dextroamphetamine (ADDERALL, 10MG,) 10 MG Tab; Take 1.5 Tablets by mouth 3 times a day as needed (Attention and Focus) for up to 30 days.  Dispense: 135 Tablet; Refill: 0  - amphetamine-dextroamphetamine (ADDERALL, 10MG,) 10 MG Tab; Take 1.5 Tablets by mouth 3 times a day as needed (Attention and Focus) for up to 30 days.  Dispense: 135 Tablet; Refill: 0  - amphetamine-dextroamphetamine (ADDERALL, 10MG,) 10 MG Tab; Take 1.5 Tablets by mouth 3 times a day as needed  (Attention and Focus) for up to 30 days.  Dispense: 135 Tablet; Refill: 0    2. MDD (major depressive disorder), recurrent severe, without psychosis (HCC)  - Controlled Substance Treatment Agreement  - amphetamine-dextroamphetamine (ADDERALL, 10MG,) 10 MG Tab; Take 1.5 Tablets by mouth 3 times a day as needed (Attention and Focus) for up to 30 days.  Dispense: 135 Tablet; Refill: 0  - amphetamine-dextroamphetamine (ADDERALL, 10MG,) 10 MG Tab; Take 1.5 Tablets by mouth 3 times a day as needed (Attention and Focus) for up to 30 days.  Dispense: 135 Tablet; Refill: 0  - amphetamine-dextroamphetamine (ADDERALL, 10MG,) 10 MG Tab; Take 1.5 Tablets by mouth 3 times a day as needed (Attention and Focus) for up to 30 days.  Dispense: 135 Tablet; Refill: 0  - buPROPion SR (WELLBUTRIN-SR) 150 MG TABLET SR 12 HR sustained-release tablet; Take 1 Tablet by mouth 2 times a day.  Dispense: 180 Tablet; Refill: 1    3. Attention deficit hyperactivity disorder (ADHD), predominantly inattentive type  - buPROPion SR (WELLBUTRIN-SR) 150 MG TABLET SR 12 HR sustained-release tablet; Take 1 Tablet by mouth 2 times a day.  Dispense: 180 Tablet; Refill: 1           Assessment and Plan:  The patient's risk of suicide is assessed as low.  Will be mindful he has had 2 ischemic strokes that affected him cognitively, reduced IQ by 30 points first stroke and he is on Coumadin.  He also has BPH, on medication, and wakes up a couple of times a night to urinate.  1.  MDD, recurrent, severe, worsening - seasonal due to lack of sunshine  ADHD, stable  SAMMY, mild, may be improving with his weight loss, states he feels better without using his CPAP - used it in the past  MCI, no change  CONCUSSION with LOC APPROX MARCH/April 2023 - FELL ON SNOW/ICE AND HIT HEAD  Got neuropsych testing following stroke in 2008/09, down from 131 to 100  Fatigue/low energy, resolved  Improve diet - previously sent article on Flavanol  He agreed to send readings within the  week via a [employer] msg to this MD: Has BP cuff to monitor BP while on Adderall.  Previously educated him about MIND diet for protective properties against Dementia  Hold, VA wouldn't approve and now not needed: Begin Trintellix 10 mg, MDD  Continue Adderall 15 mg TID, from 10 mg TID, ADHD and off lable for MDD, increased 11/21/22, reminded him that he is taking 5 mg above the FDA approved amount/day.  He endorsed understanding and said he needs this dose and that it works very well for him  Required yearly: On 2/6/24, ordered and reviewed a Controlled Substance Treatment agreement with the patient and the patient endorsed understanding and agreed to each and every item contained within it.  Continue Wellbutrin  mg BID  Previously educated him about Auvelity, and he can add Dextromethorphan OTC 45 mg BID along with the Wellbutrtin, for MDD  Previously educated him about sleep music for falling asleep  Will be mindful he smokes MJ a couple of times a month to help him fall asleep  Encouraged him to make an appt with a therapist through the [facility]  Reviewed prior visit HPI, histories and treatment plan in preparation for today's visit  Reviewed [place] PDMP    Complete VA form for continuation of care when it is due - approx October      2.  The patient has a safety plan that includes calling the 1-800 crisis line number, calling 911 and/or going to the nearest Emergency Department if symptoms worsen.    3.  Risks, benefits, alternatives and side effects were discussed for all medicines prescribed at this visit.  The patient voiced understanding providing informed consent.  The patient agrees to call the clinic with any questions or concerns, or seek emergent medical care if warranted.    4.  Follow up in 12 weeks or call sooner PRN    The proposed treatment plan was discussed with the patient who was provided the opportunity to ask questions and make suggestions regarding alternative treatment. Patient verbalized  understanding and expressed agreement with the plan.       Holly Wylie M.D.      This note was created using voice recognition software (Dragon). The accuracy of the dictation is limited by the abilities of the software. I have reviewed the note prior to signing, however some errors in grammar and context are still possible. If you have any questions related to this note please do not hesitate to contact our office.

## 2024-02-08 VITALS — SYSTOLIC BLOOD PRESSURE: 115 MMHG | DIASTOLIC BLOOD PRESSURE: 75 MMHG

## 2024-03-07 DIAGNOSIS — F33.2 MDD (MAJOR DEPRESSIVE DISORDER), RECURRENT SEVERE, WITHOUT PSYCHOSIS (HCC): ICD-10-CM

## 2024-03-07 DIAGNOSIS — F90.0 ADHD (ATTENTION DEFICIT HYPERACTIVITY DISORDER), INATTENTIVE TYPE: ICD-10-CM

## 2024-03-07 NOTE — TELEPHONE ENCOUNTER
Pharmacy is unable to keep prescriptions on hold. Pharmacy is requesting a new prescription. Please advise. Thank you!

## 2024-03-08 RX ORDER — DEXTROAMPHETAMINE SACCHARATE, AMPHETAMINE ASPARTATE, DEXTROAMPHETAMINE SULFATE AND AMPHETAMINE SULFATE 2.5; 2.5; 2.5; 2.5 MG/1; MG/1; MG/1; MG/1
15 TABLET ORAL 3 TIMES DAILY PRN
Qty: 135 TABLET | Refills: 0 | Status: SHIPPED | OUTPATIENT
Start: 2024-03-08 | End: 2024-04-07

## 2024-04-12 DIAGNOSIS — F90.0 ADHD (ATTENTION DEFICIT HYPERACTIVITY DISORDER), INATTENTIVE TYPE: ICD-10-CM

## 2024-04-12 DIAGNOSIS — F33.2 MDD (MAJOR DEPRESSIVE DISORDER), RECURRENT SEVERE, WITHOUT PSYCHOSIS (HCC): ICD-10-CM

## 2024-04-12 RX ORDER — DEXTROAMPHETAMINE SACCHARATE, AMPHETAMINE ASPARTATE, DEXTROAMPHETAMINE SULFATE AND AMPHETAMINE SULFATE 2.5; 2.5; 2.5; 2.5 MG/1; MG/1; MG/1; MG/1
15 TABLET ORAL 3 TIMES DAILY PRN
Qty: 135 TABLET | Refills: 0 | Status: SHIPPED | OUTPATIENT
Start: 2024-04-12 | End: 2024-05-12

## 2024-04-12 NOTE — TELEPHONE ENCOUNTER
Received request via: Patient    Was the patient seen in the last year in this department? Yes    Does the patient have an active prescription (recently filled or refills available) for medication(s) requested? No    [facility]     Does the patient have [facility] and need 100 day supply (blood pressure, diabetes and cholesterol meds only)? Medication is not for cholesterol, blood pressure or diabetes and Patient does not have SCP

## 2024-05-06 ENCOUNTER — TELEMEDICINE (OUTPATIENT)
Dept: BEHAVIORAL HEALTH | Facility: CLINIC | Age: 69
End: 2024-05-06
Payer: COMMERCIAL

## 2024-05-06 DIAGNOSIS — F90.0 ADHD (ATTENTION DEFICIT HYPERACTIVITY DISORDER), INATTENTIVE TYPE: ICD-10-CM

## 2024-05-06 DIAGNOSIS — F33.2 MDD (MAJOR DEPRESSIVE DISORDER), RECURRENT SEVERE, WITHOUT PSYCHOSIS (HCC): ICD-10-CM

## 2024-05-06 DIAGNOSIS — F90.0 ATTENTION DEFICIT HYPERACTIVITY DISORDER (ADHD), PREDOMINANTLY INATTENTIVE TYPE: ICD-10-CM

## 2024-05-06 PROCEDURE — 99214 OFFICE O/P EST MOD 30 MIN: CPT | Mod: 95 | Performed by: PSYCHIATRY & NEUROLOGY

## 2024-05-06 PROCEDURE — 90833 PSYTX W PT W E/M 30 MIN: CPT | Mod: 95 | Performed by: PSYCHIATRY & NEUROLOGY

## 2024-05-06 RX ORDER — DEXTROAMPHETAMINE SACCHARATE, AMPHETAMINE ASPARTATE, DEXTROAMPHETAMINE SULFATE AND AMPHETAMINE SULFATE 2.5; 2.5; 2.5; 2.5 MG/1; MG/1; MG/1; MG/1
15 TABLET ORAL 3 TIMES DAILY PRN
Qty: 135 TABLET | Refills: 0 | Status: SHIPPED | OUTPATIENT
Start: 2024-05-06 | End: 2024-06-05

## 2024-05-06 RX ORDER — BUPROPION HYDROCHLORIDE 150 MG/1
150 TABLET, EXTENDED RELEASE ORAL 2 TIMES DAILY
Qty: 180 TABLET | Refills: 1 | Status: SHIPPED | OUTPATIENT
Start: 2024-05-06

## 2024-05-06 NOTE — PROGRESS NOTES
[facility] AT [facility]  PSYCHIATRIC FOLLOW-UP NOTE    This evaluation was conducted via Zoom, using secure and encrypted videoconferencing technology. The patient was physically located at their home address in [place], [place], and the physician was located at her home office in [place], [place]. The patient was presented by self. The patient’s identity was confirmed and verbal consent for the telemedicine encounter was obtained.    CC:  Presents for follow up visit for medication evaluation and management    History Of Present Illness:  Hudson Fitch is a 69 y.o. male with history of ADHD, Inattentive Type and MDD, and SAMMY - not complaint with CPAP, hx of ischemic stroke in 2010 and says neuropsych testing showed his IQ dropped 30 pts and then he had a second stroke in 2012, [facility], presents today for a follow up visit.    The patient reported the following:  Approx March 2023: He fell on the snow and hit the back of his head a couple of months ago, knocked unconscious and no memory of it, wasn't able to work out for a couple of months, was working out at home.  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  It gives him hope that he can get back to his former self where he was more social; denies any generalized anxiety, denies social anxiety.   Denies any medication SE.  Says he has been sleeping well.  Winter is a difficult time of year for him with shorter days and less sunlight.       Had neuropsych testing in 2008/09 following his strokes and that's where he learned his IQ went from 131 to 100.      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  He took a higher dose of Adderall in the past and it did help.    History from 10/24/22 visit: \"He was diagnosed approx 2001 with ADHD after working as a  and evaluating children for ADHD.  He realized he struggled with the symptoms.  He endorses all the inattentive symptoms but denies hyperactive or impulsive symptoms.  He takes Adderall 10 mg TID.  [facility]    MDD:  He takes Wellbutrin 150 mg BID.  He endorses anhedonia, feeling depressed and down but denies feelings of hopelessness.  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  He watches a lot of TV, news, and doesn't have the motivation to do much more.  He does play pickle ball once a week.  He feels tired all the time and has low energy.  He sleeps pretty well, no problems falling asleep, despite the stimulant, wellbutrin and drinking 4 cups of coffee/day.  F/u on snoring next visit.  He has gained 20 lbs approx. over the last year.  He moves more slowly and talks less than he did in the past.\"    ROS: As noted above in HPI.        Past Psychiatric History:  Denies any hospitalizations  Denies any history of SI, SA or self harm  Medication trials:  Lexapro, Zoloft, Prozac, Effexor - didn't think any of these worked.  Has been on Wellbutrin for approximately 3 years.      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  Used to enjoy skiing and hunting.  Never .  Served in the [employer] for 3 years and then the [employer].    Allergies:  Pcn [penicillins]      Physical Examination and Mental Status Exam:  Vital signs: There were no vitals taken for this visit.    CONSTITUTIONAL:  General Appearance:  Casual attire, good eye contact, engaged with provider    ORIENTATION:  Oriented to time, place and person  RECENT AND REMOTE MEMORY:  Grossly intact  ATTENTION SPAN AND CONCENTRATION:  within normal range  LANGUAGE:  no deficits appreciated  FUND OF KNOWLEDGE:  has awareness of current events, past history and normal vocabulary  SPEECH:  normal volume, amount, rate and articulation, no perseveration or paucity of language  MOOD:  Euthymic \"Optimistic\" \"Hopeful\"  AFFECT:  Mood congruent  THOUGHT PROCESS:  logical and goal directed  THOUGHT CONTENT:  Denies any SI/HI or AVH, no delusional thinking nor preoccupations appreciated  ASSOCIATIONS:  Intact, not loose, no tangentiality or circumstantiality  MEMORY:  No gross evidence of memory deficits  JUDGMENT:  adequate concerning everyday activities  INSIGHT:  adequate to psychiatric condition    DIAGNOSTIC IMPRESSION:  1. ADHD (attention deficit hyperactivity disorder), inattentive type  - amphetamine-dextroamphetamine (ADDERALL, 10MG,) 10 MG Tab; Take 1.5 Tablets by mouth 3 times a day as needed (Attention and Focus) for up to 30 days.  Dispense: 135 Tablet; Refill: 0    2. MDD (major depressive disorder), recurrent severe, without psychosis (HCC)  - amphetamine-dextroamphetamine (ADDERALL, 10MG,) 10 MG Tab; Take 1.5 Tablets by mouth 3 times a day as needed (Attention and Focus) for up to 30 days.  Dispense: 135 Tablet; Refill: 0  - buPROPion SR (WELLBUTRIN-SR) 150 MG TABLET SR 12 HR sustained-release " tablet; Take 1 Tablet by mouth 2 times a day.  Dispense: 180 Tablet; Refill: 1    3. Attention deficit hyperactivity disorder (ADHD), predominantly inattentive type  - buPROPion SR (WELLBUTRIN-SR) 150 MG TABLET SR 12 HR sustained-release tablet; Take 1 Tablet by mouth 2 times a day.  Dispense: 180 Tablet; Refill: 1        Assessment and Plan:  The patient's risk of suicide is assessed as low.  Will be mindful he has had 2 ischemic strokes that affected him cognitively, reduced IQ by 30 points first stroke and he is on Coumadin.  He also has BPH, on medication, and wakes up a couple of times a night to urinate.  1.  MDD, recurrent, severe, greatly improved since last visit  ADHD, stable  SAMMY, mild, may be improving with his weight loss, states he feels better without using his CPAP - used it in the past  [facility]  CONCUSSION with LOC APPROX MARCH/April 2023 - FELL ON SNOW/ICE AND HIT HEAD  Got neuropsych testing following stroke in 2008/09, down from 131 to 100  Fatigue/low energy, resolved  Improve diet - previously sent article on Flavanol  Hold, VA wouldn't approve and now not needed: Begin Trintellix 10 mg, MDD  Continue Adderall 15 mg TID, from 10 mg TID, ADHD and off lable for MDD, increased 11/21/22, reminded him that he is taking 5 mg above the FDA approved amount/day.  He endorsed understanding and said he needs this dose and that it works very well for him  Required yearly: On 2/6/24, ordered and reviewed a Controlled Substance Treatment agreement with the patient and the patient endorsed understanding and agreed to each and every item contained within it.  Continue Wellbutrin  mg BID  Previously educated him about Auvelity, and he can add Dextromethorphan OTC 45 mg BID along with the Wellbutrtin, for MDD  Previously educated him about sleep music for falling asleep  Will be mindful he smokes MJ a couple of times a month to help him fall asleep  Reviewed prior visit HPI, histories and treatment plan in  preparation for today's visit  Reviewed [place] PDMP    Complete VA form for continuation of care when it is due - approx October      2.  The patient has a safety plan that includes calling the 1-800 crisis line number, calling 911 and/or going to the nearest Emergency Department if symptoms worsen.    3.  Risks, benefits, alternatives and side effects were discussed for all medicines prescribed at this visit.  The patient voiced understanding providing informed consent.  The patient agrees to call the clinic with any questions or concerns, or seek emergent medical care if warranted.    4.  Follow up in 12 weeks or call sooner PRN    The proposed treatment plan was discussed with the patient who was provided the opportunity to ask questions and make suggestions regarding alternative treatment. Patient verbalized understanding and expressed agreement with the plan.     Greater than 16 minutes of the visit was spent in psychotherapy.     Psychotherapy include:  Supportive psychotherapy and psychoeducation, topics: working at the [facility] [employer] [facility], social stimulation, novel activities, cultivating social relationships and support, boundaries.        Holly Wylie M.D.      This note was created using voice recognition software (Dragon). The accuracy of the dictation is limited by the abilities of the software. I have reviewed the note prior to signing, however some errors in grammar and context are still possible. If you have any questions related to this note please do not hesitate to contact our office.

## 2024-08-06 ENCOUNTER — TELEMEDICINE (OUTPATIENT)
Dept: BEHAVIORAL HEALTH | Facility: CLINIC | Age: 69
End: 2024-08-06
Payer: COMMERCIAL

## 2024-08-06 DIAGNOSIS — F90.0 ADHD (ATTENTION DEFICIT HYPERACTIVITY DISORDER), INATTENTIVE TYPE: ICD-10-CM

## 2024-08-06 DIAGNOSIS — F90.0 ATTENTION DEFICIT HYPERACTIVITY DISORDER (ADHD), PREDOMINANTLY INATTENTIVE TYPE: ICD-10-CM

## 2024-08-06 DIAGNOSIS — F33.2 MDD (MAJOR DEPRESSIVE DISORDER), RECURRENT SEVERE, WITHOUT PSYCHOSIS (HCC): ICD-10-CM

## 2024-08-06 PROCEDURE — 90833 PSYTX W PT W E/M 30 MIN: CPT | Mod: 95 | Performed by: PSYCHIATRY & NEUROLOGY

## 2024-08-06 PROCEDURE — 99214 OFFICE O/P EST MOD 30 MIN: CPT | Mod: 95 | Performed by: PSYCHIATRY & NEUROLOGY

## 2024-08-06 RX ORDER — DEXTROAMPHETAMINE SACCHARATE, AMPHETAMINE ASPARTATE, DEXTROAMPHETAMINE SULFATE AND AMPHETAMINE SULFATE 2.5; 2.5; 2.5; 2.5 MG/1; MG/1; MG/1; MG/1
15 TABLET ORAL 3 TIMES DAILY PRN
Qty: 135 TABLET | Refills: 0 | Status: SHIPPED | OUTPATIENT
Start: 2024-08-06 | End: 2024-09-05

## 2024-08-06 RX ORDER — BUPROPION HYDROCHLORIDE 150 MG/1
150 TABLET, EXTENDED RELEASE ORAL 2 TIMES DAILY
Qty: 180 TABLET | Refills: 1 | Status: SHIPPED | OUTPATIENT
Start: 2024-08-06

## 2024-08-06 NOTE — PROGRESS NOTES
[facility] AT [facility]  PSYCHIATRIC FOLLOW-UP NOTE    This evaluation was conducted via Microsoft Teams, using secure and encrypted videoconferencing technology. The patient was physically located at their home address in [place], [place], and the physician was located at her home office in [place], [place]. The patient was presented by self. The patient’s identity was confirmed and verbal consent for the telemedicine encounter was obtained.    CC:  Presents for follow up visit for medication evaluation and management    History Of Present Illness:  Hudson Fitch is a 69 y.o. male with history of ADHD, Inattentive Type and MDD, and SAMMY - not complaint with CPAP, hx of ischemic stroke in 2010 and says neuropsych testing showed his IQ dropped 30 pts and then he had a second stroke in 2012[facility], presents today for a follow up visit.    The patient reported the following:  Approx March 2023: He fell on the snow and hit the back of his head a couple of months ago, knocked unconscious and no memory of it, wasn't able to work out for a couple of months, was working out at home.  The last month hasn't been the best as he was sick x 7 days and then had to quarantine b/c of being exposed to someone with COVID.  His dog is sick and wasn't eating and he was told she only had 2.5 months to live, down about this.  Didn't take the adderall while he was sick.  Has continued the Wellbutrin  mg BID, no insomnia, no chest pain or palpitations, no weight loss.  A nurse comes to the  Ctr to measure BP and his is always good, most recently approx 120/80.  He enjoys his job and now the social relationships outside of work - lost one relationship temporarily as this person had to go out of state to help a family member navigate cancer treatment.    Had neuropsych testing in 2008/09 following his strokes and that's where he learned his IQ went from 131 to 100.      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  He took a higher dose of Adderall in the past and it did help.    History from 10/24/22 visit: \"He was diagnosed approx 2001 with ADHD after working as a  and evaluating children for ADHD.  He realized he struggled with the symptoms.  He endorses all the inattentive symptoms but denies hyperactive or impulsive symptoms.  He takes Adderall 10 mg TID.  [facility]    MDD:  He takes Wellbutrin 150 mg BID.  He endorses anhedonia, feeling depressed and down but denies feelings of hopelessness.  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  He watches a lot of TV, news, and doesn't have the motivation to do much more.  He does play pickle ball once a week.  He feels tired all the time and has low energy.  He sleeps pretty well, no problems falling asleep, despite the stimulant, wellbutrin and drinking 4 cups of coffee/day.  F/u on snoring next visit.  He has gained 20 lbs approx. over the last year.  He moves more slowly and talks less than he did in the past.\"    ROS: As noted above in HPI.        Past Psychiatric History:  Denies any hospitalizations  Denies any history of SI, SA or self harm  Medication trials:  Lexapro, Zoloft, Prozac, Effexor - didn't think any of these worked.  Has been on Wellbutrin for approximately 3 years.      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  Used to enjoy skiing and hunting.  Never .  Served in the [employer] for 3 years and then the [employer].    Allergies:  Pcn [penicillins]      Physical Examination and Mental Status Exam:  Vital signs: There were no vitals taken for this visit.    CONSTITUTIONAL:  General Appearance:  Casual attire, good eye contact, engaged with provider    ORIENTATION:  Oriented to time, place and person  RECENT AND REMOTE MEMORY:  Grossly intact  ATTENTION SPAN AND CONCENTRATION:  within normal range  LANGUAGE:  no deficits appreciated  FUND OF KNOWLEDGE:  has awareness of current events, past history and normal vocabulary  SPEECH:  normal volume, amount, rate and articulation, no perseveration or paucity of language  MOOD:  Euthymic \"Optimistic\" \"Hopeful\"  AFFECT:  Mood congruent  THOUGHT PROCESS:  logical and goal directed  THOUGHT CONTENT:  Denies any SI/HI or AVH, no delusional thinking nor preoccupations appreciated  ASSOCIATIONS:  Intact, not loose, no tangentiality or circumstantiality  MEMORY:  No gross evidence of memory deficits  JUDGMENT:  adequate concerning everyday activities  INSIGHT:  adequate to psychiatric condition    DIAGNOSTIC IMPRESSION:  1. ADHD (attention deficit hyperactivity disorder), inattentive type  - amphetamine-dextroamphetamine (ADDERALL, 10MG,) 10 MG Tab; Take 1.5 Tablets by mouth 3 times a day as needed (ADHD) for up to 30 days.  Dispense: 135 Tablet; Refill: 0    2. MDD (major depressive disorder), recurrent severe, without psychosis (HCC)  - buPROPion SR (WELLBUTRIN-SR) 150 MG TABLET SR 12 HR sustained-release tablet; Take 1 Tablet by mouth 2 times a day.  Dispense: " 180 Tablet; Refill: 1    3. Attention deficit hyperactivity disorder (ADHD), predominantly inattentive type  - buPROPion SR (WELLBUTRIN-SR) 150 MG TABLET SR 12 HR sustained-release tablet; Take 1 Tablet by mouth 2 times a day.  Dispense: 180 Tablet; Refill: 1        Assessment and Plan:  The patient's risk of suicide is assessed as low.  Will be mindful he has had 2 ischemic strokes that affected him cognitively, reduced IQ by 30 points first stroke and he is on Coumadin.  He also has BPH, on medication, and wakes up a couple of times a night to urinate.  1.  MDD, recurrent, severe, in partial remission  ADHD, stable  SAMMY, mild, may be improving with his weight loss, states he feels better without using his CPAP - used it in the past  [facility]  CONCUSSION with LOC APPROX MARCH/April 2023 - FELL ON SNOW/ICE AND HIT HEAD  Got neuropsych testing following stroke in 2008/09, down from 131 to 100  Fatigue/low energy, resolved  Improve diet - previously sent article on Flavanol  Hold, VA wouldn't approve and now not needed: Begin Trintellix 10 mg, MDD  Continue Adderall 15 mg TID, from 10 mg TID, ADHD and off lable for MDD, increased 11/21/22, reminded him that he is taking 5 mg above the FDA approved amount/day.  He endorsed understanding and said he needs this dose and that it works very well for him  Required yearly: On 8/6/24 educated him about need for a UDS and he wants to obtain it [facility] and agreed to talk with his PCP to obtain an ordered and to send results to this MD within the next 3 months, and also ordered and reviewed a Controlled Substance Treatment agreement with the patient and the patient endorsed understanding and agreed to each and every item contained within it.  Continue Wellbutrin  mg BID, MDD  Previously educated him about Auvelity, and he can add Dextromethorphan OTC 45 mg BID along with the Wellbutrtin, for MDD  Previously educated him about sleep music for falling asleep  Will be  mindful he smokes MJ a couple of times a month to help him fall asleep  Reviewed prior visit HPI, histories and treatment plan in preparation for today's visit  Reviewed [place] PDMP      Complete VA form for continuation of care when it is due - approx October      2.  The patient has a safety plan that includes calling the 1-800 crisis line number, calling 911 and/or going to the nearest Emergency Department if symptoms worsen.    3.  Risks, benefits, alternatives and side effects were discussed for all medicines prescribed at this visit.  The patient voiced understanding providing informed consent.  The patient agrees to call the clinic with any questions or concerns, or seek emergent medical care if warranted.    4.  Follow up in 12 weeks or call sooner PRN    The proposed treatment plan was discussed with the patient who was provided the opportunity to ask questions and make suggestions regarding alternative treatment. Patient verbalized understanding and expressed agreement with the plan.     Greater than 16 minutes of the visit was spent in psychotherapy.     Psychotherapy include:  Supportive psychotherapy and psychoeducation, topics: being isolated for approx 1 month due to being sick x 7 days and then being exposed to someone with covid and so quarantining x 10 days.  Social support. His Vanessa chung that he obtained four years ago during covid to help during the social isolation, has gotten sick unexpectedly, only 8.5 years old.  Relationship/friendships with other seniors.        Holly Wylie M.D.      This note was created using voice recognition software (Dragon). The accuracy of the dictation is limited by the abilities of the software. I have reviewed the note prior to signing, however some errors in grammar and context are still possible. If you have any questions related to this note please do not hesitate to contact our office.

## 2024-09-03 DIAGNOSIS — F90.0 ADHD (ATTENTION DEFICIT HYPERACTIVITY DISORDER), INATTENTIVE TYPE: ICD-10-CM

## 2024-09-03 RX ORDER — DEXTROAMPHETAMINE SACCHARATE, AMPHETAMINE ASPARTATE, DEXTROAMPHETAMINE SULFATE AND AMPHETAMINE SULFATE 2.5; 2.5; 2.5; 2.5 MG/1; MG/1; MG/1; MG/1
15 TABLET ORAL 3 TIMES DAILY PRN
Qty: 135 TABLET | Refills: 0 | Status: SHIPPED | OUTPATIENT
Start: 2024-09-08 | End: 2024-10-08

## 2024-10-11 DIAGNOSIS — F90.0 ADHD (ATTENTION DEFICIT HYPERACTIVITY DISORDER), INATTENTIVE TYPE: ICD-10-CM

## 2024-10-14 RX ORDER — DEXTROAMPHETAMINE SACCHARATE, AMPHETAMINE ASPARTATE, DEXTROAMPHETAMINE SULFATE AND AMPHETAMINE SULFATE 2.5; 2.5; 2.5; 2.5 MG/1; MG/1; MG/1; MG/1
15 TABLET ORAL 3 TIMES DAILY PRN
Qty: 135 TABLET | Refills: 0 | Status: SHIPPED | OUTPATIENT
Start: 2024-10-14 | End: 2024-11-13

## 2024-11-06 ENCOUNTER — APPOINTMENT (OUTPATIENT)
Dept: BEHAVIORAL HEALTH | Facility: CLINIC | Age: 69
End: 2024-11-06
Payer: COMMERCIAL

## 2024-11-21 DIAGNOSIS — F90.0 ADHD (ATTENTION DEFICIT HYPERACTIVITY DISORDER), INATTENTIVE TYPE: ICD-10-CM

## 2024-11-21 RX ORDER — DEXTROAMPHETAMINE SACCHARATE, AMPHETAMINE ASPARTATE, DEXTROAMPHETAMINE SULFATE AND AMPHETAMINE SULFATE 2.5; 2.5; 2.5; 2.5 MG/1; MG/1; MG/1; MG/1
15 TABLET ORAL 3 TIMES DAILY PRN
Qty: 135 TABLET | Refills: 0 | Status: SHIPPED | OUTPATIENT
Start: 2024-11-21 | End: 2024-12-21

## 2024-11-21 NOTE — TELEPHONE ENCOUNTER
Received request via: Patient    Was the patient seen in the last year in this department? Yes    Does the patient have an active prescription (recently filled or refills available) for medication(s) requested? No    [facility]    Does the patient have [facility] and need 100-day supply? (This applies to ALL medications) Patient does not have SCP

## 2024-12-11 ENCOUNTER — TELEMEDICINE (OUTPATIENT)
Dept: BEHAVIORAL HEALTH | Facility: CLINIC | Age: 69
End: 2024-12-11
Payer: COMMERCIAL

## 2024-12-11 DIAGNOSIS — F90.0 ATTENTION DEFICIT HYPERACTIVITY DISORDER (ADHD), PREDOMINANTLY INATTENTIVE TYPE: ICD-10-CM

## 2024-12-11 DIAGNOSIS — F33.2 MDD (MAJOR DEPRESSIVE DISORDER), RECURRENT SEVERE, WITHOUT PSYCHOSIS (HCC): ICD-10-CM

## 2024-12-11 DIAGNOSIS — F90.0 ADHD (ATTENTION DEFICIT HYPERACTIVITY DISORDER), INATTENTIVE TYPE: ICD-10-CM

## 2024-12-11 PROCEDURE — 99214 OFFICE O/P EST MOD 30 MIN: CPT | Mod: 95 | Performed by: PSYCHIATRY & NEUROLOGY

## 2024-12-11 PROCEDURE — 90833 PSYTX W PT W E/M 30 MIN: CPT | Mod: 95 | Performed by: PSYCHIATRY & NEUROLOGY

## 2024-12-11 RX ORDER — BUPROPION HYDROCHLORIDE 150 MG/1
150 TABLET, EXTENDED RELEASE ORAL 2 TIMES DAILY
Qty: 180 TABLET | Refills: 1 | Status: SHIPPED | OUTPATIENT
Start: 2024-12-11

## 2024-12-11 RX ORDER — DEXTROAMPHETAMINE SACCHARATE, AMPHETAMINE ASPARTATE, DEXTROAMPHETAMINE SULFATE AND AMPHETAMINE SULFATE 2.5; 2.5; 2.5; 2.5 MG/1; MG/1; MG/1; MG/1
15 TABLET ORAL 3 TIMES DAILY PRN
Qty: 135 TABLET | Refills: 0 | Status: SHIPPED | OUTPATIENT
Start: 2024-12-11 | End: 2025-01-10

## 2024-12-11 RX ORDER — DEXTROAMPHETAMINE SACCHARATE, AMPHETAMINE ASPARTATE, DEXTROAMPHETAMINE SULFATE AND AMPHETAMINE SULFATE 2.5; 2.5; 2.5; 2.5 MG/1; MG/1; MG/1; MG/1
15 TABLET ORAL 3 TIMES DAILY PRN
Qty: 135 TABLET | Refills: 0 | Status: SHIPPED | OUTPATIENT
Start: 2025-01-11 | End: 2025-02-10

## 2024-12-11 NOTE — PROGRESS NOTES
[facility] AT [facility]  PSYCHIATRIC FOLLOW-UP NOTE    This evaluation was conducted via Microsoft Teams, using secure and encrypted videoconferencing technology. The patient was physically located at their home address in [place], [place], and the physician was located at her home office in [place], [place]. The patient was presented by self. The patient’s identity was confirmed and verbal consent for the telemedicine encounter was obtained.    CC:  Presents for follow up visit for medication evaluation and management    History Of Present Illness:  Hudson Fitch is a 69 y.o. male with history of ADHD, Inattentive Type and MDD, and SAMMY - not complaint with CPAP, hx of ischemic stroke in 2010 and says neuropsych testing showed his IQ dropped 30 pts and then he had a second stroke in 2012, [facility], presents today for a follow up visit.    The patient reported the following:  Approx March 2023: He fell on the snow and hit the back of his head a couple of months ago, knocked unconscious and no memory of it, wasn't able to work out for a couple of months, was working out at home.    He has been without his Adderall for quite some time, last filled October 14 and this is b/c his approval for service with this MD needs to be signed yearly and he didn't receive the form in the mail like he normally does. And so his mood has been lower.  He isn't experiencing any mediation SE.  He is taking the Wellbutrin  mg BID.  No weight loss.  No insomnia.  He is continuing to work at the [employer], and he is very pleased that his dog is still living, has beaten the vet's expectations, a lady paid his vet bill at his dog's last vet visit and also paid for his dog's kidney medication and he was touched beyond words.    Had neuropsych testing in 2008/09 following his strokes and that's where he learned his IQ went from 131 to 100.      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  He took a higher dose of Adderall in the past and it did help.    History from 10/24/22 visit: \"He was diagnosed approx 2001 with ADHD after working as a  and evaluating children for ADHD.  He realized he struggled with the symptoms.  He endorses all the inattentive symptoms but denies hyperactive or impulsive symptoms.  He takes Adderall 10 mg TID.  [facility]    MDD:  He takes Wellbutrin 150 mg BID.  He endorses anhedonia, feeling depressed and down but denies feelings of hopelessness.  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  He watches a lot of TV, news, and doesn't have the motivation to do much more.  He does play pickle ball once a week.  He feels tired all the time and has low energy.  He sleeps pretty well, no problems falling asleep, despite the stimulant, wellbutrin and drinking 4 cups of coffee/day.  F/u on snoring next visit.  He has gained 20 lbs approx. over the last year.  He moves more slowly and talks less than he did in the past.\"    ROS: As noted above in HPI.        Past Psychiatric History:  Denies any hospitalizations  Denies any history of SI, SA or self harm  Medication trials:  Lexapro, Zoloft, Prozac, Effexor - didn't think any of these worked.  Has been on Wellbutrin for approximately 3 years.      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  Used to enjoy skiing and hunting.  Never .  Served in the [employer] for 3 years and then the [employer].    Allergies:  Pcn [penicillins]      Physical Examination and Mental Status Exam:  Vital signs: There were no vitals taken for this visit.    CONSTITUTIONAL:  General Appearance:  Casual attire, good eye contact, engaged with provider    ORIENTATION:  Oriented to time, place and person  RECENT AND REMOTE MEMORY:  Grossly intact  ATTENTION SPAN AND CONCENTRATION:  within normal range  LANGUAGE:  no deficits appreciated  FUND OF KNOWLEDGE:  has awareness of current events, past history and normal vocabulary  SPEECH:  normal volume, amount, rate and articulation, no perseveration or paucity of language  MOOD:  Neutral  AFFECT:  Mildly constricted  THOUGHT PROCESS:  logical and goal directed  THOUGHT CONTENT:  Denies any SI/HI or AVH, no delusional thinking nor preoccupations appreciated  ASSOCIATIONS:  Intact, not loose, no tangentiality or circumstantiality  MEMORY:  No gross evidence of memory deficits  JUDGMENT:  adequate concerning everyday activities  INSIGHT:  adequate to psychiatric condition    DIAGNOSTIC IMPRESSION:  1. ADHD (attention deficit hyperactivity disorder), inattentive type  - amphetamine-dextroamphetamine (ADDERALL, 10MG,) 10 MG Tab; Take 1.5 Tablets by mouth 3 times a day as needed (ADHD) for up to 30 days.  Dispense: 135 Tablet; Refill: 0  - amphetamine-dextroamphetamine (ADDERALL) 10 MG Tab; Take 1.5 Tablets by mouth 3 times a day as needed (ADHD) for up to 30 days.  Dispense: 135 Tablet; Refill: 0    2. MDD (major depressive disorder), recurrent severe, without psychosis (HCC)  - buPROPion SR  (WELLBUTRIN-SR) 150 MG TABLET SR 12 HR sustained-release tablet; Take 1 Tablet by mouth 2 times a day.  Dispense: 180 Tablet; Refill: 1    3. Attention deficit hyperactivity disorder (ADHD), predominantly inattentive type  - buPROPion SR (WELLBUTRIN-SR) 150 MG TABLET SR 12 HR sustained-release tablet; Take 1 Tablet by mouth 2 times a day.  Dispense: 180 Tablet; Refill: 1          Assessment and Plan:  The patient's risk of suicide is assessed as low.  Will be mindful he has had 2 ischemic strokes that affected him cognitively, reduced IQ by 30 points first stroke and he is on Coumadin.  He also has BPH, on medication, and wakes up a couple of times a night to urinate.  1.  MDD, recurrent, severe, in partial remission  ADHD, worsening due to being out of his medication  SAMMY, mild, may be improving with his weight loss, states he feels better without using his CPAP - used it in the past  [facility]  CONCUSSION with LOC APPROX MARCH/April 2023 - FELL ON SNOW/ICE AND HIT HEAD  Got neuropsych testing following stroke in 2008/09, down from 131 to 100  Fatigue/low energy, resolved  Improve diet - previously sent article on Flavanol  Hold, VA wouldn't approve and now not needed: Begin Trintellix 10 mg, MDD  Restart and titrate Adderall back to 15 mg TID, educated Hudson about the increased risk of psychosis with Adderall and so to restart it at least 1/2 of his regular dose, ADHD and off lable for MDD, increased 11/21/22, reminded him that he is taking 5 mg above the FDA approved amount/day.  He endorsed understanding and said he needs this dose and that it works very well for him  Required yearly: On 8/6/24 educated him about need for a UDS and he wants to obtain it [facility] lab and agreed to talk with his PCP to obtain an ordered and to send results to this MD within the next 3 months, and also ordered and reviewed a Controlled Substance Treatment agreement with the patient and the patient endorsed understanding and agreed  to each and every item contained within it.  Continue Wellbutrin  mg BID, MDD  Previously educated him about Auvelity, and he can add Dextromethorphan OTC 45 mg BID along with the Wellbutrtin, for MDD  Previously educated him about sleep music for falling asleep  Will be mindful he smokes MJ a couple of times a month to help him fall asleep  Reviewed prior visit HPI, histories and treatment plan in preparation for today's visit  Reviewed [place] PDMP  Sent over 2 prescriptions of Adderall, one for December and one for January  Sent a snippet of the referall information and he will set a reminder in his phone for next October 1, 2025 to make sure it has been processed  Next year, complete VA form for continuation of care when it is due - approx October 2025  Made f/u visit for 12 weeks    2.  The patient has a safety plan that includes calling the 1-800 crisis line number, calling 911 and/or going to the nearest Emergency Department if symptoms worsen.    3.  Risks, benefits, alternatives and side effects were discussed for all medicines prescribed at this visit.  The patient voiced understanding providing informed consent.  The patient agrees to call the clinic with any questions or concerns, or seek emergent medical care if warranted.    4.  Follow up in 12 weeks or call sooner PRN    The proposed treatment plan was discussed with the patient who was provided the opportunity to ask questions and make suggestions regarding alternative treatment. Patient verbalized understanding and expressed agreement with the plan.     Greater than 16 minutes of the visit was spent in psychotherapy.     Psychotherapy include:  Supportive psychotherapy and psychoeducation, topics: navigating life without his adderall on board. Delay of getting his visits approved and being without his medication. [employer] who paid for his vet bill and his dog's medication for 3 months.  Work.          Holly Wylie M.D.      This note was  created using voice recognition software (Dragon). The accuracy of the dictation is limited by the abilities of the software. I have reviewed the note prior to signing, however some errors in grammar and context are still possible. If you have any questions related to this note please do not hesitate to contact our office.

## 2025-02-24 DIAGNOSIS — F90.0 ADHD (ATTENTION DEFICIT HYPERACTIVITY DISORDER), INATTENTIVE TYPE: ICD-10-CM

## 2025-02-24 RX ORDER — DEXTROAMPHETAMINE SACCHARATE, AMPHETAMINE ASPARTATE, DEXTROAMPHETAMINE SULFATE AND AMPHETAMINE SULFATE 2.5; 2.5; 2.5; 2.5 MG/1; MG/1; MG/1; MG/1
15 TABLET ORAL 3 TIMES DAILY PRN
Qty: 135 TABLET | Refills: 0 | Status: SHIPPED | OUTPATIENT
Start: 2025-02-24 | End: 2025-03-26

## 2025-02-24 NOTE — TELEPHONE ENCOUNTER
Received request via: Patient    Was the patient seen in the last year in this department? Yes    Does the patient have an active prescription (recently filled or refills available) for medication(s) requested? No    [facility]    Does the patient have [facility] and need 100-day supply? (This applies to ALL medications) Patient does not have SCP

## 2025-02-26 NOTE — TELEPHONE ENCOUNTER
60 minute virtual therapy session via Zoom while client was in privacy of home to address diagnosis of Adjustment Disorder with Anxiety. Discussed mood, functioning and coping since last session. Client informed that he continues to struggle with bouts of anxiety, mostly related to dating situation with one particular woman, however, symptoms have been easing. Explored symptoms, including rumination, tightness in chest, muscle tension. Discussed factors involved in triggering and communication. Explored immediate family relationships with daughters, boundaries and support. Explored options for increased physical immediate coping strategies as well as long term physical activity. Psychodynamic, CBT, supportive, somatic.      Received request via: Patient    Was the patient seen in the last year in this department? Yes    Does the patient have an active prescription (recently filled or refills available) for medication(s) requested? No    [facility]     Does the patient have [facility] and need 100-day supply? (This applies to ALL medications) Patient does not have SCP

## 2025-03-17 ENCOUNTER — TELEMEDICINE (OUTPATIENT)
Dept: BEHAVIORAL HEALTH | Facility: CLINIC | Age: 70
End: 2025-03-17
Payer: COMMERCIAL

## 2025-03-17 DIAGNOSIS — F90.0 ADHD (ATTENTION DEFICIT HYPERACTIVITY DISORDER), INATTENTIVE TYPE: ICD-10-CM

## 2025-03-17 DIAGNOSIS — F90.0 ATTENTION DEFICIT HYPERACTIVITY DISORDER (ADHD), PREDOMINANTLY INATTENTIVE TYPE: ICD-10-CM

## 2025-03-17 DIAGNOSIS — F33.2 MDD (MAJOR DEPRESSIVE DISORDER), RECURRENT SEVERE, WITHOUT PSYCHOSIS (HCC): ICD-10-CM

## 2025-03-17 PROCEDURE — 99214 OFFICE O/P EST MOD 30 MIN: CPT | Mod: 95 | Performed by: PSYCHIATRY & NEUROLOGY

## 2025-03-17 RX ORDER — BUPROPION HYDROCHLORIDE 150 MG/1
150 TABLET, EXTENDED RELEASE ORAL 2 TIMES DAILY
Qty: 180 TABLET | Refills: 1 | Status: SHIPPED | OUTPATIENT
Start: 2025-03-17

## 2025-03-17 RX ORDER — DEXTROAMPHETAMINE SACCHARATE, AMPHETAMINE ASPARTATE, DEXTROAMPHETAMINE SULFATE AND AMPHETAMINE SULFATE 2.5; 2.5; 2.5; 2.5 MG/1; MG/1; MG/1; MG/1
15 TABLET ORAL 3 TIMES DAILY PRN
Qty: 135 TABLET | Refills: 0 | Status: SHIPPED | OUTPATIENT
Start: 2025-03-17 | End: 2025-04-16

## 2025-03-17 NOTE — LETTER
March 17, 2025            Regarding: Hudson Fitch, Need for an Emotional Support Animal     To Whom it May Concern:    Mr. Fitch is a patient of mine.  I am a psychiatrist.  Mr. Fitch suffers from Major Depressive Disorder (ICD 10 Code: F33.2). Mr. Fitch would benefit from being able to bring his emotional support dog to work, ian Canela [employer].      Very sincerely,            Holly Wylie M.D.

## 2025-03-17 NOTE — PROGRESS NOTES
[facility] AT [facility]  PSYCHIATRIC FOLLOW-UP NOTE    This evaluation was conducted via Microsoft Teams, using secure and encrypted videoconferencing technology. The patient was physically located at their home address in [place], [place], and the physician was located at her home office in [place], [place]. The patient was presented by self. The patient’s identity was confirmed and verbal consent for the telemedicine encounter was obtained.    CC:  Presents for follow up visit for medication evaluation and management    History Of Present Illness:  Hudson Fitch is a 69 y.o. male with history of ADHD, Inattentive Type and MDD, and SAMMY - not complaint with CPAP, hx of ischemic stroke in  and says neuropsych testing showed his IQ dropped 30 pts and then he had a second stroke in , [facility], presents today for a follow up visit.    The patient reported the following:  Approx 2023: He fell on the snow and hit the back of his head a couple of months ago, knocked unconscious and no memory of it, wasn't able to work out for a couple of months, was working out at home.    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  He brought Reanna to work on occasion.  He is taking Adderall 15 mg TID and Wellbutrin  mg BID, both working very well.  No SE noted.    Had neuropsych testing in  following his strokes and that's where he learned his IQ went from 131 to 100.      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  He  "took a higher dose of Adderall in the past and it did help.    History from 10/24/22 visit: \"He was diagnosed approx 2001 with ADHD after working as a  and evaluating children for ADHD.  He realized he struggled with the symptoms.  He endorses all the inattentive symptoms but denies hyperactive or impulsive symptoms.  He takes Adderall 10 mg TID.  [facility]    MDD:  He takes Wellbutrin 150 mg BID.  He endorses anhedonia, feeling depressed and down but denies feelings of hopelessness.  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  He watches a lot of TV, news, and doesn't have the motivation to do much more.  He does play pickle ball once a week.  He feels tired all the time and has low energy.  He sleeps pretty well, no problems falling asleep, despite the stimulant, wellbutrin and drinking 4 cups of coffee/day.  F/u on snoring next visit.  He has gained 20 lbs approx. over the last year.  He moves more slowly and talks less than he did in the past.\"    ROS: As noted above in HPI.        Past Psychiatric History:  Denies any hospitalizations  Denies any history of SI, SA or self harm  Medication trials:  Lexapro, Zoloft, Prozac, Effexor - didn't think any of these worked.  Has been on Wellbutrin for approximately 3 years.      Family Psychiatric History:  Denies    Substance Use/Addiction History:  Alcohol:  Denies  Cannabis:  about twice a month smokes to fall asleep  Tobacco:  Denies  Caffeine:  4 cups of coffee/day, update 3/6/23 - 3 cups/day 1 AM 2 afternoon  Other:  Denies any other substances    Social History:  He was born in [place], moved to [place] approx age 3, enjoyed school, denies any hx of abuse or trauma, masters degree in social work, worked for the [employer] until approx. 2011 when there were budget " cuts and he was moved to part-time, moved to [place] to be close to his brother who took a job in [place].  Used to enjoy skiing and hunting.  Never .  Served in the [employer] for 3 years and then the [employer].    Allergies:  Pcn [penicillins]      Physical Examination and Mental Status Exam:  Vital signs: There were no vitals taken for this visit.    CONSTITUTIONAL:  General Appearance:  Casual attire, good eye contact, engaged with provider    ORIENTATION:  Oriented to time, place and person  RECENT AND REMOTE MEMORY:  Grossly intact  ATTENTION SPAN AND CONCENTRATION:  within normal range  LANGUAGE:  no deficits appreciated  FUND OF KNOWLEDGE:  has awareness of current events, past history and normal vocabulary  SPEECH:  normal volume, amount, rate and articulation, no perseveration or paucity of language  MOOD: Neutral  AFFECT:  Mildly constricted but he says this is b/c he is tired b/c he just picked up Hilton yesterday in [place], [place], long drive  THOUGHT PROCESS:  logical and goal directed  MEMORY:  No gross evidence of memory deficits    DIAGNOSTIC IMPRESSION:  1. MDD (major depressive disorder), recurrent severe, without psychosis (HCC)  - buPROPion SR (WELLBUTRIN-SR) 150 MG TABLET SR 12 HR sustained-release tablet; Take 1 Tablet by mouth 2 times a day.  Dispense: 180 Tablet; Refill: 1    2. Attention deficit hyperactivity disorder (ADHD), predominantly inattentive type  - buPROPion SR (WELLBUTRIN-SR) 150 MG TABLET SR 12 HR sustained-release tablet; Take 1 Tablet by mouth 2 times a day.  Dispense: 180 Tablet; Refill: 1    3. ADHD (attention deficit hyperactivity disorder), inattentive type  - amphetamine-dextroamphetamine (ADDERALL) 10 MG Tab; Take 1.5 Tablets by mouth 3 times a day as needed (ADHD) for up to 30 days.  Dispense: 135 Tablet; Refill: 0      Assessment and Plan:  The patient's risk of suicide is assessed as low.  Will be mindful he has had 2 ischemic strokes that affected him cognitively,  reduced IQ by 30 points first stroke and he is on Coumadin.  He also has BPH, on medication, and wakes up a couple of times a night to urinate.  1.  MDD, recurrent, severe, worsening due to the loss of his beloved dog, Reanna  ADHD, stable  SAMMY, mild, may be improving with his weight loss, states he feels better without using his CPAP - used it in the past  [facility]  CONCUSSION with LOC APPROX MARCH/April 2023 - FELL ON SNOW/ICE AND HIT HEAD  Got neuropsych testing following stroke in 2008/09, down from 131 to 100  Improve diet - previously sent article on Flavanol  Hold, VA wouldn't approve and now not needed: Begin Trintellix 10 mg, MDD  Continue Adderall 15 mg TID, previously educated Hudson about the increased risk of psychosis  Required yearly: On 8/6/24 educated him about need for a UDS and he wants to obtain it through the [facility] lab and agreed to talk with his PCP to obtain an ordered and to send results to this MD within the next 3 months, and also ordered and reviewed a Controlled Substance Treatment agreement with the patient and the patient endorsed understanding and agreed to each and every item contained within it.  FOLLOW UP ON THE UDS - PLANNED TO TALK WITH HIS VA DOCTOR ABOUT ORDERING IT  Continue Wellbutrin  mg BID, MDD  Previously educated him about Auvelity, and he can add Dextromethorphan OTC 45 mg BID along with the Wellbutrtin, for MDD  Previously educated him about sleep music for falling asleep  Will be mindful he smokes MJ a couple of times a month to help him fall asleep  Reviewed prior visit HPI, histories and treatment plan in preparation for today's visit  Reviewed [place] PDMP  Next year, complete VA form for continuation of care when it is due - approx October 2025  COMPLETED LETTER MICHELLE HALL - EMOTIONAL SUPPORT LETTER FOR WORK    2.  The patient has a safety plan that includes calling the 1-800 crisis line number, calling 911 and/or going to the nearest Emergency Department if symptoms  worsen.    3.  Risks, benefits, alternatives and side effects were discussed for all medicines prescribed at this visit.  The patient voiced understanding providing informed consent.  The patient agrees to call the clinic with any questions or concerns, or seek emergent medical care if warranted.    4.  Follow up in 12 weeks or call sooner PRN    The proposed treatment plan was discussed with the patient who was provided the opportunity to ask questions and make suggestions regarding alternative treatment. Patient verbalized understanding and expressed agreement with the plan.           Holly Wylie M.D.      This note was created using voice recognition software (Dragon). The accuracy of the dictation is limited by the abilities of the software. I have reviewed the note prior to signing, however some errors in grammar and context are still possible. If you have any questions related to this note please do not hesitate to contact our office.

## 2025-04-15 DIAGNOSIS — F90.0 ADHD (ATTENTION DEFICIT HYPERACTIVITY DISORDER), INATTENTIVE TYPE: ICD-10-CM

## 2025-04-15 NOTE — TELEPHONE ENCOUNTER
Received request via: Patient    Was the patient seen in the last year in this department? Yes    Does the patient have an active prescription (recently filled or refills available) for medication(s) requested? No    [facility]    Does the patient have [facility] and need 100-day supply? (This applies to ALL medications) Patient does not have SCP

## 2025-04-16 RX ORDER — DEXTROAMPHETAMINE SACCHARATE, AMPHETAMINE ASPARTATE, DEXTROAMPHETAMINE SULFATE AND AMPHETAMINE SULFATE 2.5; 2.5; 2.5; 2.5 MG/1; MG/1; MG/1; MG/1
15 TABLET ORAL 3 TIMES DAILY PRN
Qty: 135 TABLET | Refills: 0 | Status: SHIPPED | OUTPATIENT
Start: 2025-04-17 | End: 2025-05-17

## 2025-05-14 DIAGNOSIS — F90.0 ADHD (ATTENTION DEFICIT HYPERACTIVITY DISORDER), INATTENTIVE TYPE: ICD-10-CM

## 2025-05-14 RX ORDER — DEXTROAMPHETAMINE SACCHARATE, AMPHETAMINE ASPARTATE, DEXTROAMPHETAMINE SULFATE AND AMPHETAMINE SULFATE 2.5; 2.5; 2.5; 2.5 MG/1; MG/1; MG/1; MG/1
15 TABLET ORAL 3 TIMES DAILY PRN
Qty: 135 TABLET | Refills: 0 | Status: SHIPPED | OUTPATIENT
Start: 2025-05-20 | End: 2025-06-19

## 2025-05-14 NOTE — TELEPHONE ENCOUNTER
Received request via: Patient    Was the patient seen in the last year in this department? Yes    Does the patient have an active prescription (recently filled or refills available) for medication(s) requested? No    [facility]    Does the patient have [facility] and need 100-day supply? (This applies to ALL medications) Patient does not have SCP

## 2025-06-13 DIAGNOSIS — F90.0 ADHD (ATTENTION DEFICIT HYPERACTIVITY DISORDER), INATTENTIVE TYPE: ICD-10-CM

## 2025-06-16 RX ORDER — DEXTROAMPHETAMINE SACCHARATE, AMPHETAMINE ASPARTATE, DEXTROAMPHETAMINE SULFATE AND AMPHETAMINE SULFATE 2.5; 2.5; 2.5; 2.5 MG/1; MG/1; MG/1; MG/1
15 TABLET ORAL 3 TIMES DAILY PRN
Qty: 135 TABLET | Refills: 0 | Status: SHIPPED | OUTPATIENT
Start: 2025-06-16 | End: 2025-07-16

## 2025-06-17 ENCOUNTER — TELEMEDICINE (OUTPATIENT)
Dept: BEHAVIORAL HEALTH | Facility: CLINIC | Age: 70
End: 2025-06-17
Payer: COMMERCIAL

## 2025-06-17 DIAGNOSIS — F33.2 MDD (MAJOR DEPRESSIVE DISORDER), RECURRENT SEVERE, WITHOUT PSYCHOSIS (HCC): ICD-10-CM

## 2025-06-17 DIAGNOSIS — F90.0 ATTENTION DEFICIT HYPERACTIVITY DISORDER (ADHD), PREDOMINANTLY INATTENTIVE TYPE: ICD-10-CM

## 2025-06-17 PROCEDURE — 99214 OFFICE O/P EST MOD 30 MIN: CPT | Mod: 95 | Performed by: PSYCHIATRY & NEUROLOGY

## 2025-06-17 RX ORDER — BUPROPION HYDROCHLORIDE 150 MG/1
150 TABLET, EXTENDED RELEASE ORAL 2 TIMES DAILY
Qty: 180 TABLET | Refills: 1 | Status: SHIPPED | OUTPATIENT
Start: 2025-06-17

## 2025-06-17 ASSESSMENT — PATIENT HEALTH QUESTIONNAIRE - PHQ9
2. FEELING DOWN, DEPRESSED, IRRITABLE, OR HOPELESS: 0
6. FEELING BAD ABOUT YOURSELF - OR THAT YOU ARE A FAILURE OR HAVE LET YOURSELF OR YOUR FAMILY DOWN: NOT AT ALL
3. TROUBLE FALLING OR STAYING ASLEEP OR SLEEPING TOO MUCH: NOT AT ALL
2. FEELING DOWN, DEPRESSED, IRRITABLE, OR HOPELESS: NOT AT ALL
4. FEELING TIRED OR HAVING LITTLE ENERGY: 0
5. POOR APPETITE OR OVEREATING: NOT AT ALL
7. TROUBLE CONCENTRATING ON THINGS, SUCH AS READING THE NEWSPAPER OR WATCHING TELEVISION: NOT AT ALL
5. POOR APPETITE OR OVEREATING: 0
1. LITTLE INTEREST OR PLEASURE IN DOING THINGS: NOT AT ALL
9. THOUGHTS THAT YOU WOULD BE BETTER OFF DEAD, OR OF HURTING YOURSELF: NOT AT ALL
SUM OF ALL RESPONSES TO PHQ QUESTIONS 1-9: 0
1. LITTLE INTEREST OR PLEASURE IN DOING THINGS: 0
7. TROUBLE CONCENTRATING ON THINGS, SUCH AS READING THE NEWSPAPER OR WATCHING TELEVISION: 0
9. THOUGHTS THAT YOU WOULD BE BETTER OFF DEAD, OR OF HURTING YOURSELF: 0
8. MOVING OR SPEAKING SO SLOWLY THAT OTHER PEOPLE COULD HAVE NOTICED. OR THE OPPOSITE, BEING SO FIGETY OR RESTLESS THAT YOU HAVE BEEN MOVING AROUND A LOT MORE THAN USUAL: 0
6. FEELING BAD ABOUT YOURSELF - OR THAT YOU ARE A FAILURE OR HAVE LET YOURSELF OR YOUR FAMILY DOWN: 0
3. TROUBLE FALLING OR STAYING ASLEEP OR SLEEPING TOO MUCH: 0
4. FEELING TIRED OR HAVING LITTLE ENERGY: NOT AT ALL
10. IF YOU CHECKED OFF ANY PROBLEMS, HOW DIFFICULT HAVE THESE PROBLEMS MADE IT FOR YOU TO DO YOUR WORK, TAKE CARE OF THINGS AT HOME, OR GET ALONG WITH OTHER PEOPLE: NOT DIFFICULT AT ALL
CLINICAL INTERPRETATION OF PHQ2 SCORE: 0
5. POOR APPETITE OR OVEREATING: 0 - NOT AT ALL
8. MOVING OR SPEAKING SO SLOWLY THAT OTHER PEOPLE COULD HAVE NOTICED. OR THE OPPOSITE, BEING SO FIGETY OR RESTLESS THAT YOU HAVE BEEN MOVING AROUND A LOT MORE THAN USUAL: NOT AT ALL

## 2025-06-17 NOTE — PROGRESS NOTES
"[facility]  PSYCHIATRIC FOLLOW-UP NOTE    This evaluation was conducted via Microsoft Teams, using secure and encrypted videoconferencing technology. The patient was physically located at their home address in [place], [place], and the physician was located at her home office in [place], [place]. The patient was presented by self. The patient’s identity was confirmed and verbal consent for the telemedicine encounter was obtained.    CC:  Presents for follow up visit for medication evaluation and management    History Of Present Illness:  Hudson Fitch is a 69 y.o. male with history of ADHD, Inattentive Type and MDD, and SAMMY - not complaint with CPAP, hx of ischemic stroke in 2010 and says neuropsych testing showed his IQ dropped 30 pts and then he had a second stroke in 2012, [facility], presents today for a follow up visit.    The patient reported the following:  Approx March 2023: He fell on the snow and hit the back of his head a couple of months ago, knocked unconscious and no memory of it, wasn't able to work out for a couple of months, was working out at home.    He is experiencing stressors at work where a [employer] group is complaining about him b/c they want the whole Sr Ctr room.  He got his HR involved and his company is VERY supportive of him.  No medication SE.  No chest pain or palpitations.  His attention and focus is \"alright.\"     Had neuropsych testing in 2008/09 following his strokes and that's where he learned his IQ went from 131 to 100.      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  He took a higher dose of Adderall in the past and it did help.    History from 10/24/22 visit: \"He was diagnosed approx 2001 with ADHD after working as a  " "and evaluating children for ADHD.  He realized he struggled with the symptoms.  He endorses all the inattentive symptoms but denies hyperactive or impulsive symptoms.  He takes Adderall 10 mg TID.  [facility]    MDD:  He takes Wellbutrin 150 mg BID.  He endorses anhedonia, feeling depressed and down but denies feelings of hopelessness.  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  He watches a lot of TV, news, and doesn't have the motivation to do much more.  He does play pickle ball once a week.  He feels tired all the time and has low energy.  He sleeps pretty well, no problems falling asleep, despite the stimulant, wellbutrin and drinking 4 cups of coffee/day.  F/u on snoring next visit.  He has gained 20 lbs approx. over the last year.  He moves more slowly and talks less than he did in the past.\"    ROS: As noted above in HPI.        Past Psychiatric History:  Denies any hospitalizations  Denies any history of SI, SA or self harm  Medication trials:  Lexapro, Zoloft, Prozac, Effexor - didn't think any of these worked.  Has been on Wellbutrin for approximately 3 years.      Family Psychiatric History:  Denies    Substance Use/Addiction History:  Alcohol:  Denies  Cannabis:  about twice a month smokes to fall asleep  Tobacco:  Denies  Caffeine:  4 cups of coffee/day, update 3/6/23 - 3 cups/day 1 AM 2 afternoon, update 6/17/2025: still drinking about 3 cups/day  Other:  Denies any other substances    Social History:  He was born in [place], moved to [place] appro[place] age 3, enjoyed school, denies any hx of abuse or trauma, masters degree in social work, worked for the [employer] until approx. 2011 when there were budget cuts and he was moved to part-time, moved to [place] to be close to his brother who took a job in [place].  Used to enjoy " skiing and hunting.  Never .  Served in the [employer] for 3 years and then the [employer].    Allergies:  Pcn [penicillins]      Physical Examination and Mental Status Exam:  Vital signs: There were no vitals taken for this visit.    CONSTITUTIONAL:  General Appearance:  Casual attire, good eye contact, engaged with provider    ORIENTATION:  Oriented to time, place and person  RECENT AND REMOTE MEMORY:  Grossly intact  ATTENTION SPAN AND CONCENTRATION:  within normal range  LANGUAGE:  no deficits appreciated  FUND OF KNOWLEDGE:  has awareness of current events, past history and normal vocabulary  SPEECH:  normal volume, amount, rate and articulation, no perseveration or paucity of language  MOOD: Euthymic  AFFECT:  Mood congruent  THOUGHT PROCESS:  logical and goal directed  MEMORY:  No gross evidence of memory deficits    DIAGNOSTIC IMPRESSION:  1. MDD (major depressive disorder), recurrent severe, without psychosis (HCC)    2. Attention deficit hyperactivity disorder (ADHD), predominantly inattentive type        Assessment and Plan:  The patient's risk of suicide is assessed as low.  Will be mindful he has had 2 ischemic strokes that affected him cognitively, reduced IQ by 30 points first stroke and he is on Coumadin.  He also has BPH, on medication, and wakes up a couple of times a night to urinate.  1.  MDD, recurrent, severe, in remission  ADHD, stable  SAMMY, mild, may be improving with his weight loss, states he feels better without using his CPAP - used it in the past  [facility]  CONCUSSION with LOC APPROX MARCH/April 2023 - FELL ON SNOW/ICE AND HIT HEAD  Got neuropsych testing following stroke in 2008/09, down from 131 to 100  Improve diet - previously sent article on Flavanol  Hold, VA wouldn't approve and now not needed: Begin Trintellix 10 mg, MDD  Continue Adderall 15 mg TID, previously educated Hudson about the increased risk of psychosis  Continue Wellbutrin  mg BID, MDD  Required yearly: On 8/6/24  educated him about need for a UDS and he wants to obtain it through the [facility] lab and agreed to talk with his PCP to obtain an ordered and to send results to this MD within the next 3 months, and also ordered and reviewed a Controlled Substance Treatment agreement with the patient and the patient endorsed understanding and agreed to each and every item contained within it.  He has AN UPCOMING VISIT WITH HIS PCP IN AUG 2025 AND AGREED TO ASK HIM TO ORDER A UDS TO MEET THE REQUIREMENT FOR THIS MD TO CONTINUE PRESCRIBING HIS CONTROLLED MEDICATION ADDERALL  Previously educated him about Auvelity, and he can add Dextromethorphan OTC 45 mg BID along with the Wellbutrtin, for MDD  Previously educated him about sleep music for falling asleep  Will be mindful he smokes MJ a couple of times a month to help him fall asleep  Next year, complete VA form for continuation of care when it is due - approx October 2025  Reviewed prior visit HPI, histories and treatment plan in preparation for today's visit  Reviewed [place] PDMP  Reviewed PHQ9    2.  The patient has a safety plan that includes calling the 1-800 crisis line number, calling 911 and/or going to the nearest Emergency Department if symptoms worsen.    3.  Risks, benefits, alternatives and side effects were discussed for all medicines prescribed at this visit.  The patient voiced understanding providing informed consent.  The patient agrees to call the clinic with any questions or concerns, or seek emergent medical care if warranted.    4.  Follow up in 12 weeks or call sooner PRN    The proposed treatment plan was discussed with the patient who was provided the opportunity to ask questions and make suggestions regarding alternative treatment. Patient verbalized understanding and expressed agreement with the plan.           Holly Wylie M.D.      This note was created using voice recognition software (Dragon). The accuracy of the dictation is limited by the abilities of the  software. I have reviewed the note prior to signing, however some errors in grammar and context are still possible. If you have any questions related to this note please do not hesitate to contact our office.

## 2025-07-17 DIAGNOSIS — F90.0 ADHD (ATTENTION DEFICIT HYPERACTIVITY DISORDER), INATTENTIVE TYPE: ICD-10-CM

## 2025-07-17 NOTE — TELEPHONE ENCOUNTER
Received request via: Patient    Was the patient seen in the last year in this department? Yes    Does the patient have an active prescription (recently filled or refills available) for medication(s) requested? No    [facility]    Does the patient have [facility] and need 100-day supply? (This applies to ALL medications) Patient does not have SCP

## 2025-07-18 RX ORDER — DEXTROAMPHETAMINE SACCHARATE, AMPHETAMINE ASPARTATE, DEXTROAMPHETAMINE SULFATE AND AMPHETAMINE SULFATE 2.5; 2.5; 2.5; 2.5 MG/1; MG/1; MG/1; MG/1
15 TABLET ORAL 3 TIMES DAILY PRN
Qty: 135 TABLET | Refills: 0 | Status: SHIPPED | OUTPATIENT
Start: 2025-07-18 | End: 2025-08-17

## 2025-08-19 DIAGNOSIS — F90.0 ADHD (ATTENTION DEFICIT HYPERACTIVITY DISORDER), INATTENTIVE TYPE: ICD-10-CM

## 2025-08-20 RX ORDER — DEXTROAMPHETAMINE SACCHARATE, AMPHETAMINE ASPARTATE, DEXTROAMPHETAMINE SULFATE AND AMPHETAMINE SULFATE 2.5; 2.5; 2.5; 2.5 MG/1; MG/1; MG/1; MG/1
15 TABLET ORAL 3 TIMES DAILY PRN
Qty: 135 TABLET | Refills: 0 | Status: SHIPPED | OUTPATIENT
Start: 2025-08-20 | End: 2025-09-19